# Patient Record
Sex: FEMALE | Race: WHITE
[De-identification: names, ages, dates, MRNs, and addresses within clinical notes are randomized per-mention and may not be internally consistent; named-entity substitution may affect disease eponyms.]

---

## 2017-02-09 ENCOUNTER — E-VISIT (OUTPATIENT)
Dept: OTHER | Age: 67
End: 2017-02-09

## 2017-02-09 DIAGNOSIS — Z53.9 ERRONEOUS ENCOUNTER--DISREGARD: Primary | ICD-10-CM

## 2017-02-09 NOTE — TELEPHONE ENCOUNTER
Responded to mychart.     Patient not seen since 2014. Needs OV. Notified Kina no longer at [place].    Joceline Cantrell, RN, BSN

## 2017-02-10 NOTE — TELEPHONE ENCOUNTER
Spoke with pt. She got meds OTC and is feeling better. Explained rationale of care, and primary care follow-up. Pt. Was understanding and would like to not have the evisit.   Joceline Walsh

## 2017-02-24 ENCOUNTER — OFFICE VISIT (OUTPATIENT)
Dept: ENDOCRINOLOGY | Facility: CLINIC | Age: 67
End: 2017-02-24
Payer: COMMERCIAL

## 2017-02-24 VITALS
SYSTOLIC BLOOD PRESSURE: 151 MMHG | WEIGHT: 160.72 LBS | DIASTOLIC BLOOD PRESSURE: 75 MMHG | HEIGHT: 66 IN | HEART RATE: 52 BPM | BODY MASS INDEX: 25.83 KG/M2

## 2017-02-24 DIAGNOSIS — E78.5 HYPERLIPIDEMIA LDL GOAL <100: ICD-10-CM

## 2017-02-24 DIAGNOSIS — I10 HYPERTENSION GOAL BP (BLOOD PRESSURE) < 130/80: ICD-10-CM

## 2017-02-24 DIAGNOSIS — R80.9 TYPE 2 DIABETES MELLITUS WITH MICROALBUMINURIA, WITHOUT LONG-TERM CURRENT USE OF INSULIN (H): Primary | ICD-10-CM

## 2017-02-24 DIAGNOSIS — E11.29 TYPE 2 DIABETES MELLITUS WITH MICROALBUMINURIA, WITHOUT LONG-TERM CURRENT USE OF INSULIN (H): Primary | ICD-10-CM

## 2017-02-24 DIAGNOSIS — E11.9 TYPE 2 DIABETES MELLITUS WITHOUT COMPLICATION, WITHOUT LONG-TERM CURRENT USE OF INSULIN (H): ICD-10-CM

## 2017-02-24 PROCEDURE — 99215 OFFICE O/P EST HI 40 MIN: CPT | Performed by: INTERNAL MEDICINE

## 2017-02-24 RX ORDER — ATENOLOL 100 MG/1
100 TABLET ORAL DAILY
Qty: 30 TABLET | Refills: 0 | Status: SHIPPED | OUTPATIENT
Start: 2017-02-24 | End: 2017-02-24

## 2017-02-24 RX ORDER — ATENOLOL 100 MG/1
100 TABLET ORAL DAILY
Qty: 90 TABLET | Refills: 3 | Status: SHIPPED | OUTPATIENT
Start: 2017-02-24 | End: 2017-10-02 | Stop reason: ALTCHOICE

## 2017-02-24 RX ORDER — ATORVASTATIN CALCIUM 40 MG/1
40 TABLET, FILM COATED ORAL DAILY
Qty: 90 TABLET | Refills: 3 | Status: SHIPPED | OUTPATIENT
Start: 2017-02-24 | End: 2018-04-17

## 2017-02-24 RX ORDER — METFORMIN HCL 500 MG
TABLET, EXTENDED RELEASE 24 HR ORAL
Qty: 360 TABLET | Refills: 3 | Status: SHIPPED | OUTPATIENT
Start: 2017-02-24 | End: 2018-04-17

## 2017-02-24 RX ORDER — GLIPIZIDE 10 MG/1
10 TABLET ORAL
Qty: 180 TABLET | Refills: 3 | Status: SHIPPED | OUTPATIENT
Start: 2017-02-24 | End: 2018-04-17

## 2017-02-24 RX ORDER — PIOGLITAZONEHYDROCHLORIDE 15 MG/1
15 TABLET ORAL DAILY
Qty: 30 TABLET | Refills: 1 | Status: SHIPPED | OUTPATIENT
Start: 2017-02-24 | End: 2018-05-17 | Stop reason: SINTOL

## 2017-02-24 RX ORDER — AMLODIPINE BESYLATE 10 MG/1
10 TABLET ORAL DAILY
Qty: 90 TABLET | Refills: 3 | Status: SHIPPED | OUTPATIENT
Start: 2017-02-24 | End: 2018-03-26

## 2017-02-24 NOTE — NURSING NOTE
"Isabella Mcginnis's goals for this visit include: Follow up Diabetes  She requests these members of her care team be copied on today's visit information: NO    PCP: Kaitlynn Blanc [facility]    Referring Provider:  No referring provider defined for this encounter.    Chief Complaint   Patient presents with     Diabetes       Initial Ht 1.67 m (5' 5.75\")  Wt 72.9 kg (160 lb 11.5 oz)  LMP 11/28/2007  BMI 26.14 kg/m2 Estimated body mass index is 26.14 kg/(m^2) as calculated from the following:    Height as of this encounter: 1.67 m (5' 5.75\").    Weight as of this encounter: 72.9 kg (160 lb 11.5 oz).  Medication Reconciliation: complete    Do you need any medication refills at today's visit? YES-Atenolol [facility]    "

## 2017-02-24 NOTE — PATIENT INSTRUCTIONS
Sending blood sugars to your provider at [facility]:  We want to help you with your diabetes management, which often requires frequent adjustments to your therapy. For your convenience, we have several ways to send your blood sugars to your doctor for review.    - Send message directly to your doctor through My Chart.  Please ask the rooming staff if you would like to sign up for My Chart.  This is a fast and confidential way to send your information and communicate directly with your provider.   - Record readings and fax to [phone].  We have a template for you to use for your convenience.  - If you have a Medtronic pump, upload to [employer] and notify your provider of your username and password.   - Stop by the clinic with your meter for download.   - My Chart or call Kacie Kearney, Diabetes Educator at [phone]  - Call the clinic and speak to one of the endocrine nurses to relay information on the telephone.  Floresita Alvarez, or Lola at [phone].   -    Please call the on-call Endocrinologist at the [place] [facility] for after       hours/weekend needs at [phone] Option #4.    Please note that you do not need to FAST if you are just having an A1C drawn. Please remember to ALWAYS bring your glucose meter with to your appointment. This data is very important for the management of your care.    Thank you!  Your [facility] Care Team

## 2017-02-24 NOTE — PROGRESS NOTES
- Endocrinology Follow up -    Reason for visit/consult:  DM follow up  [facility] provider: [facility]    HPI: 66 yo female here for the second visit for her DM. Previous visit, noted her A1c has been worsening but she had stressful life event, loss of family at that time and could not take care of her self. We discussed about insulin therapy, but she determined she will do diet modification and lose weight and wanted to pend for insulin. Trajenta was prescribed previous visit, she tried but had hives and stopped less than one month. Currently on metformin and glipizide only.   In the past 2 month, she lost 11 lb, she started to check am glucose at home.   Glucose   fasting am: [phone]  pm- [phone]  BP at home: 115/65, 133/59, 131/77, 116/54, 150/70, 166/66, 139/65    Current regimen  Metformin 2000 daily  glipizide 10 mg BID  Actos 15mg (start new from 2/24/2017)         DM complications:  Retinopathy: cataract surgery 2015, last exam was last year, next January 2017.  Nephropathy: positive microalbuminria once in 2012. On losartan  Neuropathy: none  LDL 85 (5/2015) - on atorvastatin 40 mg    Past Medical/Surgical History:  Past Medical History   Diagnosis Date     Normal delivery      x2 one son with spina bifida     Obesity      Pure hypercholesterolemia      Rheumatic fever without mention of heart involvement age 5      resulting in a murmur until age 18     Type II or unspecified type diabetes mellitus without mention of complication, not stated as uncontrolled      Unspecified essential hypertension      Past Surgical History   Procedure Laterality Date     No history of surgery         Allergies:  Allergies   Allergen Reactions     Nkda [No Known Drug Allergies]      Prinivil [Lisinopril] Cough       Current Medications   Current Outpatient Prescriptions  "  Medication     pioglitazone (ACTOS) 15 MG tablet     atenolol (TENORMIN) 100 MG tablet     amLODIPine (NORVASC) 10 MG tablet     metFORMIN (GLUCOPHAGE-XR) 500 MG 24 hr tablet     glipiZIDE (GLUCOTROL) 10 MG tablet     atorvastatin (LIPITOR) 40 MG tablet     calcium-vitamin D (CALCIUM 600 + D) 600-400 MG-UNIT per tablet     losartan-hydrochlorothiazide (HYZAAR) 100-25 MG per tablet     glucose blood VI test strips (ACCU-CHEK COMPACT DRUM TEST STRIPS) strip     SOFTCLIX LANCETS MISC     Blood Glucose Monitoring Suppl (ACCU-CHEK COMPACT CARE KIT) KIT     MULTIVITAMIN TABS   OR     ASPIRIN 81 MG OR TABS     [DISCONTINUED] atenolol (TENORMIN) 100 MG tablet     [DISCONTINUED] amLODIPine (NORVASC) 10 MG tablet     [DISCONTINUED] metFORMIN (GLUCOPHAGE-XR) 500 MG 24 hr tablet     [DISCONTINUED] glipiZIDE (GLUCOTROL) 10 MG tablet     [DISCONTINUED] atenolol (TENORMIN) 100 MG tablet     [DISCONTINUED] atorvastatin (LIPITOR) 40 MG tablet     No current facility-administered medications for this visit.        Family History:  Family History   Problem Relation Age of Onset     DIABETES Mother      Hypertension Mother      CEREBROVASCULAR DISEASE Mother      C.A.D. Father      Asthma No family hx of      Breast Cancer No family hx of      Cancer - colorectal No family hx of      Prostate Cancer No family hx of        Social History:  Social History   Substance Use Topics     Smoking status: Never Smoker     Smokeless tobacco: Not on file     Alcohol use No       ROS:  Full review of systems taken with the help of the intake sheet. Otherwise a complete 14 point review of systems was taken and is negative unless stated in the history above.    Physical Exam:   Blood pressure 151/75, pulse 52, height 1.67 m (5' 5.75\"), weight 72.9 kg (160 lb 11.5 oz), last menstrual period 11/28/2007.    General: well appearing, no acute distress, pleasant and conversant,   Mental Status/neuro: alert and oriented  Face: symmetrical, normal facial " color  Eyes: anicteric, PERRL  Neck: suppler, no lymphadenopahty  Thyroid: normal size and texture, no nodule palpable  Heart: regular rhythm, S1S2, no murmur appreciated  Lung: clear to auscultation bilaterally  Abdomen: soft, NT/ND  Legs: no swelling or edema  Feet: no deformities or ulcers, 2+ DP pulses, normal monofilament sensation    ENDO VITALS-UMP 2/24/2017 12/2/2016   Weight 160 lb 11.5 oz 171 lb 12.8 oz       Labs (General):   Lab Results   Component Value Date     05/20/2014      Lab Results   Component Value Date    POTASSIUM 3.9 05/20/2014     Lab Results   Component Value Date    CHLORIDE 100 07/25/2012     Lab Results   Component Value Date    MANDI 9.3 07/25/2012     Lab Results   Component Value Date    CO2 29 07/25/2012     Lab Results   Component Value Date    BUN 15 07/25/2012     Lab Results   Component Value Date    CR 0.92 06/15/2015     Lab Results   Component Value Date     05/20/2014     Lab Results   Component Value Date    TSH 1.78 02/24/2014     No results found for: T4  Lab Results   Component Value Date    A1C 11.7 12/02/2016     Assessment and Plan  67 year old female with DM2 on oral regimens, currently on intensive life style modification,    - Glucose log reviewed and still suboptimal but much better for her previous A1c 11, may due to weight loss  - I discussed with patient. She wants to avoid insulin as much as possible, she could not tolerate trajenta, will try pioglitazone 15 mg for a month and will continue monitor glucose levels. We discussed risk and benefit of pioglitazone, I explained cardiac side effect report and bladder cancer report, leg edema to the patient.   - RTC in 4 month    I spent 45 mint with this patient face to face and explained the conditions and plans (more than 50% of time was counseling/coordination of care) . The patient understood and is satisfied with today's visit. Return to clinic with me in 4 months.         Claudette Marino MD  Staff  Physician  Endocrinology and Metabolism  License: [id number]

## 2017-02-24 NOTE — MR AVS SNAPSHOT
After Visit Summary   2/24/2017    Isabella Mcginnis    MRN: [id number]           Patient Information     Date Of Birth          1950        Visit Information        Provider Department      2/24/2017 11:00 AM Claudette Marino MD [facility]        Today's Diagnoses     Type 2 diabetes mellitus with microalbuminuria, without long-term current use of insulin (H)    -  1    Hypertension goal BP (blood pressure) < 130/80          Care Instructions      Sending blood sugars to your provider at [facility]:  We want to help you with your diabetes management, which often requires frequent adjustments to your therapy. For your convenience, we have several ways to send your blood sugars to your doctor for review.    - Send message directly to your doctor through My Chart.  Please ask the rooming staff if you would like to sign up for My Chart.  This is a fast and confidential way to send your information and communicate directly with your provider.   - Record readings and fax to [phone].  We have a template for you to use for your convenience.  - If you have a Medtronic pump, upload to [employer] and notify your provider of your username and password.   - Stop by the clinic with your meter for download.   - My Chart or call Kacie Kearney, Diabetes Educator at [phone]  - Call the clinic and speak to one of the endocrine nurses to relay information on the telephone.  Floresita Alvarez, or Lola at [phone].   -    Please call the on-call Endocrinologist at the [facility] for after       hours/weekend needs at [phone] Option #4.    Please note that you do not need to FAST if you are just having an A1C drawn. Please remember to ALWAYS bring your glucose meter with to your appointment. This data is very important for the management of your care.    Thank you!  Your [facility] Diabetes Care Team              Follow-ups after your visit        Your next 10 appointments already  scheduled     Jun 30, 2017 10:50 AM CDT   Return Visit with Claudette Marino MD, [facility] ENDO NURSE   [facility] ([facility])    [address] [address] [place] [id number] [phone]              Who to contact     If you have questions or need follow up information about today's clinic visit or your schedule please contact [facility] directly at [phone].  Normal or non-critical lab and imaging results will be communicated to you by [employer], letter or phone within 4 business days after the clinic has received the results. If you do not hear from us within 7 days, please contact the clinic through [employer] or phone. If you have a critical or abnormal lab result, we will notify you by phone as soon as possible.  Submit refill requests through [employer] or call your pharmacy and they will forward the refill request to us. Please allow 3 business days for your refill to be completed.          Additional Information About Your Visit        [employer] Information     [employer] gives you secure access to your electronic health record. If you see a primary care provider, you can also send messages to your care team and make appointments. If you have questions, please call your primary care clinic.  If you do not have a primary care provider, please call [phone] and they will assist you.      [employer] is an electronic gateway that provides easy, online access to your medical records. With [employer], you can request a clinic appointment, read your test results, renew a prescription or communicate with your care team.     To access your existing account, please contact your [facility] or call [phone] for assistance.        Care EveryWhere ID     This is your Care EveryWhere ID. This could be used by other organizations to access your [facility] medical records  [id number]        Your Vitals Were     Pulse Height Last Period BMI (Body Mass  "Index)          52 1.67 m (5' 5.75\") 11/28/2007 26.14 kg/m2         Blood Pressure from Last 3 Encounters:   02/24/17 151/75   12/02/16 179/84   09/16/15 136/62    Weight from Last 3 Encounters:   02/24/17 72.9 kg (160 lb 11.5 oz)   12/02/16 77.9 kg (171 lb 12.8 oz)   09/16/15 68.2 kg (150 lb 6 oz)              Today, you had the following     No orders found for display         Today's Medication Changes          These changes are accurate as of: 2/24/17 11:43 AM.  If you have any questions, ask your nurse or doctor.               Start taking these medicines.        Dose/Directions    pioglitazone 15 MG tablet   Commonly known as:  ACTOS   Used for:  Type 2 diabetes mellitus with microalbuminuria, without long-term current use of insulin (H)   Started by:  Claudette Marino MD        Dose:  15 mg   Take 1 tablet (15 mg) by mouth daily   Quantity:  30 tablet   Refills:  1         Stop taking these medicines if you haven't already. Please contact your care team if you have questions.     linagliptin 5 MG Tabs tablet   Commonly known as:  TRADJENTA   Stopped by:  Claudette Marino MD                Where to get your medicines      These medications were sent to [email or web address] - [place], [place] - [address] AT [facility] [address] ( [facility])  [address], [place] [address]     Phone:  [phone]     atenolol 100 MG tablet    pioglitazone 15 MG tablet                Primary Care Provider    [facility]       No address on file        Thank you!     Thank you for choosing [facility]  for your care. Our goal is always to provide you with excellent care. Hearing back from our patients is one way we can continue to improve our services. Please take a few minutes to complete the written survey that you may receive in the mail after your visit with us. Thank you!             Your Updated Medication List - Protect others around you: Learn how to safely use, " store and throw away your medicines at www.disposemymeds.org.          This list is accurate as of: 2/24/17 11:43 AM.  Always use your most recent med list.                   Brand Name Dispense Instructions for use    amLODIPine 10 MG tablet    NORVASC    90 tablet    Take 1 tablet (10 mg) by mouth daily       aspirin 81 MG tablet      1 TABLET DAILY       atenolol 100 MG tablet    TENORMIN    30 tablet    Take 1 tablet (100 mg) by mouth daily       atorvastatin 40 MG tablet    LIPITOR    90 tablet    Take 1 tablet (40 mg) by mouth daily       blood glucose monitoring lancets     300 each    Use to check blood sugar 3-4 times daily       blood glucose monitoring meter device kit     1 kit    Use to test blood sugars 3-4  times daily or as directed.       blood glucose monitoring test strip    ACCU-CHEK COMPACT DRUM    400 strip    Use to test blood sugars 3-4 times daily or as directed.       calcium-vitamin D 600-400 MG-UNIT per tablet    calcium 600 + D    90 tablet    Take 1 tablet by mouth daily       glipiZIDE 10 MG tablet    GLUCOTROL    180 tablet    Take 1 tablet (10 mg) by mouth 2 times daily (before meals)       losartan-hydrochlorothiazide 100-25 MG per tablet    HYZAAR    90 tablet    Take 1 tablet by mouth daily       metFORMIN 500 MG 24 hr tablet    GLUCOPHAGE-XR    360 tablet    4 tabs daily or as directed       MULTIVITAMIN TABS   OR      1 TABLET DAILY       pioglitazone 15 MG tablet    ACTOS    30 tablet    Take 1 tablet (15 mg) by mouth daily

## 2017-03-24 ENCOUNTER — TRANSFERRED RECORDS (OUTPATIENT)
Dept: HEALTH INFORMATION MANAGEMENT | Facility: CLINIC | Age: 67
End: 2017-03-24

## 2017-05-26 ENCOUNTER — HEALTH MAINTENANCE LETTER (OUTPATIENT)
Age: 67
End: 2017-05-26

## 2017-10-02 ENCOUNTER — TELEPHONE (OUTPATIENT)
Dept: ENDOCRINOLOGY | Facility: CLINIC | Age: 67
End: 2017-10-02

## 2017-10-02 DIAGNOSIS — I10 ESSENTIAL HYPERTENSION: Primary | ICD-10-CM

## 2017-10-02 RX ORDER — METOPROLOL SUCCINATE 100 MG/1
100 TABLET, EXTENDED RELEASE ORAL DAILY
Qty: 90 TABLET | Refills: 3 | Status: SHIPPED | OUTPATIENT
Start: 2017-10-02 | End: 2018-05-17

## 2018-01-17 DIAGNOSIS — I10 BENIGN ESSENTIAL HYPERTENSION: ICD-10-CM

## 2018-01-17 RX ORDER — LOSARTAN POTASSIUM AND HYDROCHLOROTHIAZIDE 25; 100 MG/1; MG/1
TABLET ORAL
Qty: 90 TABLET | Refills: 3 | Status: SHIPPED | OUTPATIENT
Start: 2018-01-17

## 2018-03-23 ENCOUNTER — TRANSFERRED RECORDS (OUTPATIENT)
Dept: HEALTH INFORMATION MANAGEMENT | Facility: CLINIC | Age: 68
End: 2018-03-23

## 2018-03-26 DIAGNOSIS — I10 HYPERTENSION GOAL BP (BLOOD PRESSURE) < 130/80: ICD-10-CM

## 2018-03-26 RX ORDER — AMLODIPINE BESYLATE 10 MG/1
10 TABLET ORAL DAILY
Qty: 90 TABLET | Refills: 0 | Status: SHIPPED | OUTPATIENT
Start: 2018-03-26 | End: 2018-05-17

## 2018-03-26 NOTE — TELEPHONE ENCOUNTER
Faxed refill request received from [facility].   Mediation Requested: Amlodipine 10mg  Directions: Take one tablet by mouth daily  Quantity: 90  Last Office Visit: 2/24/17  Next Appointment Scheduled for: 5/4/18 with Dr. Thakur.    Writer called patient to review. Patient is seeing Dr. Thakur in May to see if Endocrine follow up is needed or if primary can manage care.    Will forward to Dr. Marino to review.    Floresita Smith LPN  Adult Endocrinology  [facility]

## 2018-04-01 ENCOUNTER — TRANSFERRED RECORDS (OUTPATIENT)
Dept: HEALTH INFORMATION MANAGEMENT | Facility: CLINIC | Age: 68
End: 2018-04-01

## 2018-04-17 DIAGNOSIS — E78.5 HYPERLIPIDEMIA LDL GOAL <100: ICD-10-CM

## 2018-04-17 DIAGNOSIS — E11.9 TYPE 2 DIABETES MELLITUS WITHOUT COMPLICATION, WITHOUT LONG-TERM CURRENT USE OF INSULIN (H): ICD-10-CM

## 2018-04-17 RX ORDER — METFORMIN HCL 500 MG
TABLET, EXTENDED RELEASE 24 HR ORAL
Qty: 360 TABLET | Refills: 0 | Status: SHIPPED | OUTPATIENT
Start: 2018-04-17 | End: 2018-05-17

## 2018-04-17 RX ORDER — ATORVASTATIN CALCIUM 40 MG/1
40 TABLET, FILM COATED ORAL DAILY
Qty: 90 TABLET | Refills: 0 | Status: SHIPPED | OUTPATIENT
Start: 2018-04-17 | End: 2018-06-25

## 2018-04-17 RX ORDER — GLIPIZIDE 10 MG/1
10 TABLET ORAL
Qty: 180 TABLET | Refills: 3 | Status: SHIPPED | OUTPATIENT
Start: 2018-04-17

## 2018-04-17 NOTE — TELEPHONE ENCOUNTER
Please refer to 3/26/18 Encounter for details.    Patient hoping to transfer care to primary.     Will forward to Dr. Marino to review.    Floresita Smith LPN  Adult Endocrinology  [facility]

## 2018-05-14 ASSESSMENT — ACTIVITIES OF DAILY LIVING (ADL)
CURRENT_FUNCTION: NO ASSISTANCE NEEDED
I_NEED_ASSISTANCE_FOR_THE_FOLLOWING_DAILY_ACTIVITIES:: NO ASSISTANCE IS NEEDED

## 2018-05-17 ENCOUNTER — OFFICE VISIT (OUTPATIENT)
Dept: PEDIATRICS | Facility: CLINIC | Age: 68
End: 2018-05-17
Payer: COMMERCIAL

## 2018-05-17 VITALS
HEART RATE: 71 BPM | OXYGEN SATURATION: 97 % | BODY MASS INDEX: 26.68 KG/M2 | HEIGHT: 66 IN | DIASTOLIC BLOOD PRESSURE: 84 MMHG | SYSTOLIC BLOOD PRESSURE: 188 MMHG | WEIGHT: 166 LBS | TEMPERATURE: 97.3 F

## 2018-05-17 DIAGNOSIS — I10 ESSENTIAL HYPERTENSION: ICD-10-CM

## 2018-05-17 DIAGNOSIS — Z23 ENCOUNTER FOR IMMUNIZATION: ICD-10-CM

## 2018-05-17 DIAGNOSIS — Z78.0 MENOPAUSE: ICD-10-CM

## 2018-05-17 DIAGNOSIS — E78.5 HYPERLIPIDEMIA LDL GOAL <100: ICD-10-CM

## 2018-05-17 DIAGNOSIS — E11.9 TYPE 2 DIABETES MELLITUS WITHOUT COMPLICATION, WITHOUT LONG-TERM CURRENT USE OF INSULIN (H): ICD-10-CM

## 2018-05-17 DIAGNOSIS — Z11.59 ENCOUNTER FOR HEPATITIS C SCREENING TEST FOR LOW RISK PATIENT: ICD-10-CM

## 2018-05-17 DIAGNOSIS — Z00.00 MEDICARE ANNUAL WELLNESS VISIT, SUBSEQUENT: Primary | ICD-10-CM

## 2018-05-17 DIAGNOSIS — I10 WHITE COAT SYNDROME WITH HYPERTENSION: ICD-10-CM

## 2018-05-17 DIAGNOSIS — E11.65 TYPE 2 DIABETES MELLITUS WITH HYPERGLYCEMIA, WITHOUT LONG-TERM CURRENT USE OF INSULIN (H): ICD-10-CM

## 2018-05-17 DIAGNOSIS — I10 HYPERTENSION GOAL BP (BLOOD PRESSURE) < 130/80: ICD-10-CM

## 2018-05-17 DIAGNOSIS — Z12.31 ENCOUNTER FOR SCREENING MAMMOGRAM FOR BREAST CANCER: ICD-10-CM

## 2018-05-17 PROCEDURE — 90471 IMMUNIZATION ADMIN: CPT | Performed by: INTERNAL MEDICINE

## 2018-05-17 PROCEDURE — 90714 TD VACC NO PRESV 7 YRS+ IM: CPT | Performed by: INTERNAL MEDICINE

## 2018-05-17 PROCEDURE — 90670 PCV13 VACCINE IM: CPT | Performed by: INTERNAL MEDICINE

## 2018-05-17 PROCEDURE — 90472 IMMUNIZATION ADMIN EACH ADD: CPT | Performed by: INTERNAL MEDICINE

## 2018-05-17 PROCEDURE — 99213 OFFICE O/P EST LOW 20 MIN: CPT | Mod: 25 | Performed by: INTERNAL MEDICINE

## 2018-05-17 PROCEDURE — G0439 PPPS, SUBSEQ VISIT: HCPCS | Performed by: INTERNAL MEDICINE

## 2018-05-17 RX ORDER — ATENOLOL 100 MG/1
100 TABLET ORAL DAILY
Qty: 90 TABLET | Refills: 3 | Status: SHIPPED | OUTPATIENT
Start: 2018-05-17

## 2018-05-17 RX ORDER — AMLODIPINE BESYLATE 10 MG/1
10 TABLET ORAL DAILY
Qty: 90 TABLET | Refills: 3 | Status: SHIPPED | OUTPATIENT
Start: 2018-05-17

## 2018-05-17 RX ORDER — METFORMIN HCL 500 MG
TABLET, EXTENDED RELEASE 24 HR ORAL
Qty: 360 TABLET | Refills: 1 | Status: SHIPPED | OUTPATIENT
Start: 2018-05-17 | End: 2018-05-21 | Stop reason: SINTOL

## 2018-05-17 NOTE — PROGRESS NOTES
SUBJECTIVE:   Isabella Mcginnis is a 68 year old female who presents for Preventive Visit.    New patient to establish care and wellness visit. Her  who was a pt of mine passed away a year ago. She has not been taking care of herself in the last 2 years while caring for him during his last year and since his passing. She has gone through grieving and is feeling better now. She has good support. One son lives in [place]. One adult disabled son lives in [place]. Patient is in the process of selling her house and plans to eventually move to [place] to be closer to her son there. Her other son is going to move as well.   History of HTN, but also white coat component. She tracks her BP at home. Always in the 110s to 120s. She used to be on atenolol but pharmacy switched to to metoprolol. This has a higher copay. She would like to go back to atenolol.  She does not want to go back to see endocrine anymore for diabetes. Feels she wants just one doctor to manage everything.   Are you in the first 12 months of your Medicare Part B coverage?  No    Healthy Habits:    Do you get at least three servings of calcium containing foods daily (dairy, green leafy vegetables, etc.)? no    Amount of exercise or daily activities, outside of work: 2 day(s) per week    Problems taking medications regularly No    Medication side effects: No    Have you had an eye exam in the past two years? yes    Do you see a dentist twice per year? no    Do you have sleep apnea, excessive snoring or daytime drowsiness?yes      Ability to successfully perform activities of daily living: Yes, no assistance needed    Home safety:  none identified     Hearing impairment: No    Fall risk:  Fallen 2 or more times in the past year?: No  Any fall with injury in the past year?: No        COGNITIVE SCREEN  1) Repeat 3 items (Banana, Sunrise, Chair)    2) Clock draw: NORMAL  3) 3 item recall: Recalls 3 objects   Results: NORMAL clock, 3 items recalled:  COGNITIVE IMPAIRMENT LESS LIKELY    Mini-CogTM Copyright WAGNER Ngo. Licensed by the author for use in [facility]; reprinted with permission ([email or web address]@[email or web address]). All rights reserved.            PROBLEMS TO ADD ON...    Reviewed and updated as needed this visit by clinical staff  Tobacco  Allergies  Meds  Soc Hx        Reviewed and updated as needed this visit by Provider        Social History   Substance Use Topics     Smoking status: Never Smoker     Smokeless tobacco: Never Used     Alcohol use No       If you drink alcohol do you typically have >3 drinks per day or >7 drinks per week? No                        Today's PHQ-2 Score:   PHQ-2 ( 1999 Pfizer) 5/17/2018 5/14/2018   Q1: Little interest or pleasure in doing things 0 0   Q2: Feeling down, depressed or hopeless 0 0   PHQ-2 Score 0 0   Q1: Little interest or pleasure in doing things - Not at all   Q2: Feeling down, depressed or hopeless - Not at all   PHQ-2 Score - 0       Do you feel safe in your environment - Yes    Do you have a Health Care Directive?: No: Advance care planning reviewed with patient; information given to patient to review.    Current providers sharing in care for this patient include:   Patient Care Team:  Clinic, [facility] as PCP - General    The following health maintenance items are reviewed in Epic and correct as of today:  Health Maintenance   Topic Date Due     ADVANCE DIRECTIVE PLANNING Q5 YRS  01/23/2006     MAMMO SCREEN Q2 YR (SYSTEM ASSIGNED)  05/20/2015     DEXA SCAN SCREENING (SYSTEM ASSIGNED)  01/23/2016     FOOT EXAM Q1 YEAR  12/02/2017     INFLUENZA VACCINE (Season Ended) 09/01/2018     A1C Q6 MO  11/18/2018     EYE EXAM Q1 YEAR  04/01/2019     FALL RISK ASSESSMENT  05/17/2019     PNEUMOCOCCAL (2 of 2 - PPSV23) 05/17/2019     CREATININE Q1 YEAR  05/18/2019     LIPID MONITORING Q1 YEAR  05/18/2019     MICROALBUMIN Q1 YEAR  05/18/2019     TSH W/ FREE T4 REFLEX Q2 YEAR  05/18/2020     COLON CANCER  "SCREEN (SYSTEM ASSIGNED)  07/08/2021     TETANUS IMMUNIZATION (SYSTEM ASSIGNED)  05/17/2028     HEPATITIS C SCREENING  Completed     Labs reviewed in EPIC    Pneumonia Vaccine:Adults age 65+ who have not received previous Pneumovax (PPSV23) or PCV13 as an adult: Should first be given PCV13 AND then should be given PPSV23 6-12 months after PCV13  Mammogram Screening: Patient over age 50, mutual decision to screen reflected in health maintenance.    ROS:  Constitutional, HEENT, cardiovascular, pulmonary, gi and gu systems are negative, except as otherwise noted.    OBJECTIVE:   /84  Pulse 71  Temp 97.3  F (36.3  C) (Temporal)  Ht 5' 5.75\" (1.67 m)  Wt 166 lb (75.3 kg)  LMP 12/21/2007  SpO2 97%  BMI 27 kg/m2 Estimated body mass index is 27 kg/(m^2) as calculated from the following:    Height as of this encounter: 5' 5.75\" (1.67 m).    Weight as of this encounter: 166 lb (75.3 kg).  EXAM:   GENERAL: healthy, alert and no distress  EYES: Eyes grossly normal to inspection, PERRL and conjunctivae and sclerae normal  HENT: ear canals and TM's normal, nose and mouth without ulcers or lesions  NECK: no adenopathy, no asymmetry, masses, or scars and thyroid normal to palpation  RESP: lungs clear to auscultation - no rales, rhonchi or wheezes  BREAST: normal without masses, tenderness or nipple discharge and no palpable axillary masses or adenopathy  CV: regular rate and rhythm, normal S1 S2, no S3 or S4, no murmur, click or rub, no peripheral edema and peripheral pulses strong  ABDOMEN: soft, nontender, no hepatosplenomegaly, no masses and bowel sounds normal  MS: no gross musculoskeletal defects noted, no edema  SKIN: no suspicious lesions or rashes  NEURO: Normal strength and tone, mentation intact and speech normal  PSYCH: mentation appears normal, affect normal/bright        ASSESSMENT / PLAN:       ICD-10-CM    1. Medicare annual wellness visit, subsequent Z00.00    2. Hypertension goal BP (blood pressure) < " 130/80 I10 CBC with platelets     Albumin Random Urine Quantitative with Creat Ratio     amLODIPine (NORVASC) 10 MG tablet   3. Hyperlipidemia LDL goal <100 E78.5 Lipid panel reflex to direct LDL Non-fasting   4. Type 2 diabetes mellitus with hyperglycemia, without long-term current use of insulin (H) E11.65 CBC with platelets     Comprehensive metabolic panel (BMP + Alb, Alk Phos, ALT, AST, Total. Bili, TP)     Hemoglobin A1c     Albumin Random Urine Quantitative with Creat Ratio     TSH with free T4 reflex   5. Encounter for hepatitis C screening test for low risk patient Z11.59 Hepatitis C antibody   6. Encounter for immunization Z23 TD PRESERV FREE >=7 YRS ADS IM     Pneumococcal vaccine 13 valent PCV13 IM (Prevnar) [31934]     ADMIN MEDICARE: Pneumococcal Vaccine ()     Pneumococcal vaccine 13 valent PCV13 IM (Prevnar) [92024]   7. Encounter for screening mammogram for breast cancer Z12.31 MA Screening Digital Bilateral   8. Menopause Z78.0 DX Hip/Pelvis/Spine   9. Type 2 diabetes mellitus without complication, without long-term current use of insulin (H) E11.9 metFORMIN (GLUCOPHAGE-XR) 500 MG 24 hr tablet   10. Essential hypertension I10 atenolol (TENORMIN) 100 MG tablet   11. White coat syndrome with hypertension I10      -- patient with multiple chronic health issues.   -- HTN: BP is elevated but white coat syndrome. Change metoprolol to Atenolol due to cost. She is due for monitoring labs. She will return to do them.  -- diabetes: may not be as well controlled based on patient's self report. Will wait for labs.     Preventive: mammogram, updated vaccines, colonoscopy is up to date.     End of Life Planning:  Patient currently has an advanced directive: No.  I have verified the patient's ablity to prepare an advanced directive/make health care decisions.  Literature was provided to assist patient in preparing an advanced directive.    COUNSELING:  Reviewed preventive health counseling, as reflected in  "patient instructions        Estimated body mass index is 27 kg/(m^2) as calculated from the following:    Height as of this encounter: 5' 5.75\" (1.67 m).    Weight as of this encounter: 166 lb (75.3 kg).       reports that she has never smoked. She has never used smokeless tobacco.      Appropriate preventive services were discussed with this patient, including applicable screening as appropriate for cardiovascular disease, diabetes, osteopenia/osteoporosis, and glaucoma.  As appropriate for age/gender, discussed screening for colorectal cancer, prostate cancer, breast cancer, and cervical cancer. Checklist reviewing preventive services available has been given to the patient.    Reviewed patients plan of care and provided an AVS. The Intermediate Care Plan ( asthma action plan, low back pain action plan, and migraine action plan) for Isabella meets the Care Plan requirement. This Care Plan has been established and reviewed with the Patient.    Counseling Resources:  ATP IV Guidelines  Pooled Cohorts Equation Calculator  Breast Cancer Risk Calculator  FRAX Risk Assessment  ICSI Preventive Guidelines  Dietary Guidelines for Americans, 2010  [employer]'s MyPlate  ASA Prophylaxis  Lung CA Screening    Jennifer Thakur MD PhD  [facility]  Answers for HPI/ROS submitted by the patient on 5/14/2018   Annual Exam:  Getting at least 3 servings of Calcium per day:: NO  Bi-annual eye exam:: Yes  Dental care twice a year:: Yes  Sleep apnea or symptoms of sleep apnea:: None  Diet:: Low salt, Diabetic  Frequency of exercise:: 2-3 days/week  Taking medications regularly:: Yes  Medication side effects:: None  Additional concerns today:: No  Activities of Daily Living: no assistance needed  Home safety: no safety concerns identified  Hearing Impairment:: no hearing concerns  PHQ-2 Score: 0  Duration of exercise:: 15-30 minutes    "

## 2018-05-17 NOTE — MR AVS SNAPSHOT
After Visit Summary   2018    Isabella Mcginnis    MRN: [id number]           Patient Information     Date Of Birth          1950        Visit Information        Provider Department      2018 11:50 AM Jennifer Thakur MD PhD [facility]        Today's Diagnoses     Medicare annual wellness visit, subsequent    -  1    Hypertension goal BP (blood pressure) < 130/80        Hyperlipidemia LDL goal <100        Type 2 diabetes mellitus with hyperglycemia, without long-term current use of insulin (H)        Encounter for hepatitis C screening test for low risk patient        Encounter for immunization        Encounter for screening mammogram for breast cancer        Menopause        Type 2 diabetes mellitus without complication, without long-term current use of insulin (H)        Essential hypertension          Care Instructions    Make appointment(s) for:   -- fasting lab tomorrow  -- diabetes follow up next week  -- mammogram and bone density.         Medication(s) prescribed today:    Orders Placed This Encounter   Medications     amLODIPine (NORVASC) 10 MG tablet     Sig: Take 1 tablet (10 mg) by mouth daily     Dispense:  90 tablet     Refill:  3     metFORMIN (GLUCOPHAGE-XR) 500 MG 24 hr tablet     Si tabs daily or as directed     Dispense:  360 tablet     Refill:  1     atenolol (TENORMIN) 100 MG tablet     Sig: Take 1 tablet (100 mg) by mouth daily     Dispense:  90 tablet     Refill:  3     Replaces metoprolol             Preventive Health Recommendations    Female Ages 65 +    Yearly exam:     See your health care provider every year in order to  o Review health changes.   o Discuss preventive care.    o Review your medicines if your doctor has prescribed any.      You no longer need a yearly Pap test unless you've had an abnormal Pap test in the past 10 years. If you have vaginal symptoms, such as bleeding or discharge, be sure to talk with your provider about a Pap  test.      Every 1 to 2 years, have a mammogram.  If you are over 69, talk with your health care provider about whether or not you want to continue having screening mammograms.      Every 10 years, have a colonoscopy. Or, have a yearly FIT test (stool test). These exams will check for colon cancer.       Have a cholesterol test every 5 years, or more often if your doctor advises it.       Have a diabetes test (fasting glucose) every three years. If you are at risk for diabetes, you should have this test more often.       At age 65, have a bone density scan (DEXA) to check for osteoporosis (brittle bone disease).    Shots:    Get a flu shot each year.    Get a tetanus shot every 10 years.    Talk to your doctor about your pneumonia vaccines. There are now two you should receive - Pneumovax (PPSV 23) and Prevnar (PCV 13).    Talk to your doctor about the shingles vaccine.    Talk to your doctor about the hepatitis B vaccine.    Nutrition:     Eat at least 5 servings of fruits and vegetables each day.      Eat whole-grain bread, whole-wheat pasta and brown rice instead of white grains and rice.      Talk to your provider about Calcium and Vitamin D.     Lifestyle    Exercise at least 150 minutes a week (30 minutes a day, 5 days a week). This will help you control your weight and prevent disease.      Limit alcohol to one drink per day.      No smoking.       Wear sunscreen to prevent skin cancer.       See your dentist twice a year for an exam and cleaning.      See your eye doctor every 1 to 2 years to screen for conditions such as glaucoma, macular degeneration and cataracts.          Follow-ups after your visit        Future tests that were ordered for you today     Open Future Orders        Priority Expected Expires Ordered    MA Screening Digital Bilateral Routine  5/17/2019 5/17/2018    DX Hip/Pelvis/Spine Routine  5/17/2019 5/17/2018    CBC with platelets Routine 5/17/2018 5/31/2018 5/17/2018    Comprehensive  metabolic panel (BMP + Alb, Alk Phos, ALT, AST, Total. Bili, TP) Routine 5/17/2018 5/31/2018 5/17/2018    Hemoglobin A1c Routine 5/17/2018 5/31/2018 5/17/2018    Lipid panel reflex to direct LDL Non-fasting Routine 5/17/2018 5/31/2018 5/17/2018    Albumin Random Urine Quantitative with Creat Ratio Routine 5/17/2018 5/31/2018 5/17/2018    TSH with free T4 reflex Routine 5/17/2018 5/31/2018 5/17/2018    Pneumococcal vaccine 13 valent PCV13 IM (Prevnar) [99023] Routine 5/17/2018 5/31/2018 5/17/2018    ADMIN MEDICARE: Pneumococcal Vaccine () Routine 5/17/2018 5/31/2018 5/17/2018    Hepatitis C antibody Routine 5/17/2018 5/31/2018 5/17/2018            Who to contact     If you have questions or need follow up information about today's clinic visit or your schedule please contact [facility] directly at [phone].  Normal or non-critical lab and imaging results will be communicated to you by StatusPagehart, letter or phone within 4 business days after the clinic has received the results. If you do not hear from us within 7 days, please contact the clinic through NERIt or phone. If you have a critical or abnormal lab result, we will notify you by phone as soon as possible.  Submit refill requests through Workable or call your pharmacy and they will forward the refill request to us. Please allow 3 business days for your refill to be completed.          Additional Information About Your Visit        Workable Information     Workable gives you secure access to your electronic health record. If you see a primary care provider, you can also send messages to your care team and make appointments. If you have questions, please call your primary care clinic.  If you do not have a primary care provider, please call [phone] and they will assist you.      Workable is an electronic gateway that provides easy, online access to your medical records. With Workable, you can request a clinic appointment, read your test  "results, renew a prescription or communicate with your care team.     To access your existing account, please contact your [facility] or call [phone] for assistance.        Care EveryWhere ID     This is your Care EveryWhere ID. This could be used by other organizations to access your [facility] medical records  [id number]        Your Vitals Were     Pulse Temperature Height Last Period Pulse Oximetry BMI (Body Mass Index)    71 97.3  F (36.3  C) (Temporal) 5' 5.75\" (1.67 m) 12/21/2007 97% 27 kg/m2       Blood Pressure from Last 3 Encounters:   05/17/18 188/84   02/24/17 151/75   12/02/16 179/84    Weight from Last 3 Encounters:   05/17/18 166 lb (75.3 kg)   02/24/17 160 lb 11.5 oz (72.9 kg)   12/02/16 171 lb 12.8 oz (77.9 kg)              We Performed the Following     TD PRESERV FREE >=7 YRS ADS IM          Today's Medication Changes          These changes are accurate as of 5/17/18 12:26 PM.  If you have any questions, ask your nurse or doctor.               Start taking these medicines.        Dose/Directions    atenolol 100 MG tablet   Commonly known as:  TENORMIN   Used for:  Essential hypertension   Replaces:  metoprolol succinate 100 MG 24 hr tablet   Started by:  Jennifer Thakur MD PhD        Dose:  100 mg   Take 1 tablet (100 mg) by mouth daily   Quantity:  90 tablet   Refills:  3         Stop taking these medicines if you haven't already. Please contact your care team if you have questions.     metoprolol succinate 100 MG 24 hr tablet   Commonly known as:  TOPROL-XL   Replaced by:  atenolol 100 MG tablet   Stopped by:  Jennifer Thakur MD PhD           pioglitazone 15 MG tablet   Commonly known as:  ACTOS   Stopped by:  Jennifer Thakur MD PhD                Where to get your medicines      These medications were sent to [facility] - [place], [place] - [address] [facility] [address]  [address] [facility] [address] [facility] [place] [address]     Phone:  [phone]     amLODIPine 10 " MG tablet    atenolol 100 MG tablet    metFORMIN 500 MG 24 hr tablet                Primary Care Provider Office Phone # Fax #    Kaitlynn [facility] [phone] [address]  [facility] [address]        Equal Access to Services     JAMAICA CASTILLO : Hadii aad ku hadasho Soomaali, waaxda luqadaha, qaybta kaalmada adeegyada, waxay idiin hayaan adeeg khchidi lasaeedkarla almonte. So [facility] [phone].    ATENCIÓN: Si habla español, tiene a singh disposición servicios gratuitos de asistencia lingüística. Llame al [phone].    We comply with applicable federal civil rights laws and [place] laws. We do not discriminate on the basis of race, color, national origin, age, disability, sex, sexual orientation, or gender identity.            Thank you!     Thank you for choosing [facility]  for your care. Our goal is always to provide you with excellent care. Hearing back from our patients is one way we can continue to improve our services. Please take a few minutes to complete the written survey that you may receive in the mail after your visit with us. Thank you!             Your Updated Medication List - Protect others around you: Learn how to safely use, store and throw away your medicines at www.disposemymeds.org.          This list is accurate as of 5/17/18 12:26 PM.  Always use your most recent med list.                   Brand Name Dispense Instructions for use Diagnosis    amLODIPine 10 MG tablet    NORVASC    90 tablet    Take 1 tablet (10 mg) by mouth daily    Hypertension goal BP (blood pressure) < 130/80       aspirin 81 MG tablet      1 TABLET DAILY        atenolol 100 MG tablet    TENORMIN    90 tablet    Take 1 tablet (100 mg) by mouth daily    Essential hypertension       atorvastatin 40 MG tablet    LIPITOR    90 tablet    Take 1 tablet (40 mg) by mouth daily    Hyperlipidemia LDL goal <100       blood glucose monitoring lancets     300 each    Use to check blood  sugar 3-4 times daily    Type 2 diabetes, HbA1c goal < 7% (H)       blood glucose monitoring meter device kit     1 kit    Use to test blood sugars 3-4  times daily or as directed.    Hypertension goal BP (blood pressure) < 130/80, Type 2 diabetes, HbA1c goal < 7% (H), Hyperlipidemia LDL goal <100, Need for prophylactic vaccination and inoculation against influenza       blood glucose monitoring test strip    ACCU-CHEK COMPACT DRUM    400 strip    Use to test blood sugars 3-4 times daily or as directed.    Type 2 diabetes, HbA1c goal < 7% (H)       calcium-vitamin D 600-400 MG-UNIT per tablet    calcium 600 + D    90 tablet    Take 1 tablet by mouth daily    Type 2 diabetes mellitus without complication, without long-term current use of insulin (H)       glipiZIDE 10 MG tablet    GLUCOTROL    180 tablet    Take 1 tablet (10 mg) by mouth 2 times daily (before meals)    Type 2 diabetes mellitus without complication, without long-term current use of insulin (H)       losartan-hydrochlorothiazide 100-25 MG per tablet    HYZAAR    90 tablet    Take 1 tablet by mouth daily    Benign essential hypertension       metFORMIN 500 MG 24 hr tablet    GLUCOPHAGE-XR    360 tablet    4 tabs daily or as directed    Type 2 diabetes mellitus without complication, without long-term current use of insulin (H)       MULTIVITAMIN TABS   OR      1 TABLET DAILY

## 2018-05-17 NOTE — NURSING NOTE
Screening Questionnaire for Adult Immunization    Are you sick today?   No   Do you have allergies to medications, food, a vaccine component or latex?   No   Have you ever had a serious reaction after receiving a vaccination?   No   Do you have a long-term health problem with heart disease, lung disease, asthma, kidney disease, metabolic disease (e.g. diabetes), anemia, or other blood disorder?   No   Do you have cancer, leukemia, HIV/AIDS, or any other immune system problem?   No   In the past 3 months, have you taken medications that affect  your immune system, such as prednisone, other steroids, or anticancer drugs; drugs for the treatment of rheumatoid arthritis, Crohn s disease, or psoriasis; or have you had radiation treatments?   No   Have you had a seizure, or a brain or other nervous system problem?   No   During the past year, have you received a transfusion of blood or blood     products, or been given immune (gamma) globulin or antiviral drug?   No   For women: Are you pregnant or is there a chance you could become        pregnant during the next month?   No   Have you received any vaccinations in the past 4 weeks?   No     Immunization questionnaire answers were all negative.      MNVFC doesn't apply on this patient           Screening performed by Paige Fonseca

## 2018-05-17 NOTE — Clinical Note
Please abstract the following data from this visit with this patient into the appropriate field in Epic:  Eye exam with ophthalmology on this date: 4-1-18 [place] [employer]

## 2018-05-17 NOTE — PATIENT INSTRUCTIONS
Make appointment(s) for:   -- fasting lab tomorrow  -- diabetes follow up next week  -- mammogram and bone density.         Medication(s) prescribed today:    Orders Placed This Encounter   Medications     amLODIPine (NORVASC) 10 MG tablet     Sig: Take 1 tablet (10 mg) by mouth daily     Dispense:  90 tablet     Refill:  3     metFORMIN (GLUCOPHAGE-XR) 500 MG 24 hr tablet     Si tabs daily or as directed     Dispense:  360 tablet     Refill:  1     atenolol (TENORMIN) 100 MG tablet     Sig: Take 1 tablet (100 mg) by mouth daily     Dispense:  90 tablet     Refill:  3     Replaces metoprolol             Preventive Health Recommendations    Female Ages 65 +    Yearly exam:     See your health care provider every year in order to  o Review health changes.   o Discuss preventive care.    o Review your medicines if your doctor has prescribed any.      You no longer need a yearly Pap test unless you've had an abnormal Pap test in the past 10 years. If you have vaginal symptoms, such as bleeding or discharge, be sure to talk with your provider about a Pap test.      Every 1 to 2 years, have a mammogram.  If you are over 69, talk with your health care provider about whether or not you want to continue having screening mammograms.      Every 10 years, have a colonoscopy. Or, have a yearly FIT test (stool test). These exams will check for colon cancer.       Have a cholesterol test every 5 years, or more often if your doctor advises it.       Have a diabetes test (fasting glucose) every three years. If you are at risk for diabetes, you should have this test more often.       At age 65, have a bone density scan (DEXA) to check for osteoporosis (brittle bone disease).    Shots:    Get a flu shot each year.    Get a tetanus shot every 10 years.    Talk to your doctor about your pneumonia vaccines. There are now two you should receive - Pneumovax (PPSV 23) and Prevnar (PCV 13).    Talk to your doctor about the shingles  vaccine.    Talk to your doctor about the hepatitis B vaccine.    Nutrition:     Eat at least 5 servings of fruits and vegetables each day.      Eat whole-grain bread, whole-wheat pasta and brown rice instead of white grains and rice.      Talk to your provider about Calcium and Vitamin D.     Lifestyle    Exercise at least 150 minutes a week (30 minutes a day, 5 days a week). This will help you control your weight and prevent disease.      Limit alcohol to one drink per day.      No smoking.       Wear sunscreen to prevent skin cancer.       See your dentist twice a year for an exam and cleaning.      See your eye doctor every 1 to 2 years to screen for conditions such as glaucoma, macular degeneration and cataracts.

## 2018-05-18 DIAGNOSIS — E11.65 TYPE 2 DIABETES MELLITUS WITH HYPERGLYCEMIA, WITHOUT LONG-TERM CURRENT USE OF INSULIN (H): ICD-10-CM

## 2018-05-18 DIAGNOSIS — E78.5 HYPERLIPIDEMIA LDL GOAL <100: ICD-10-CM

## 2018-05-18 DIAGNOSIS — Z11.59 ENCOUNTER FOR HEPATITIS C SCREENING TEST FOR LOW RISK PATIENT: ICD-10-CM

## 2018-05-18 DIAGNOSIS — I10 HYPERTENSION GOAL BP (BLOOD PRESSURE) < 130/80: ICD-10-CM

## 2018-05-18 LAB
ALBUMIN SERPL-MCNC: 3.7 G/DL (ref 3.4–5)
ALP SERPL-CCNC: 128 U/L (ref 40–150)
ALT SERPL W P-5'-P-CCNC: 16 U/L (ref 0–50)
ANION GAP SERPL CALCULATED.3IONS-SCNC: 6 MMOL/L (ref 3–14)
AST SERPL W P-5'-P-CCNC: 9 U/L (ref 0–45)
BILIRUB SERPL-MCNC: 0.7 MG/DL (ref 0.2–1.3)
BUN SERPL-MCNC: 15 MG/DL (ref 7–30)
CALCIUM SERPL-MCNC: 9.2 MG/DL (ref 8.5–10.1)
CHLORIDE SERPL-SCNC: 101 MMOL/L (ref 94–109)
CHOLEST SERPL-MCNC: 133 MG/DL
CO2 SERPL-SCNC: 30 MMOL/L (ref 20–32)
CREAT SERPL-MCNC: 0.85 MG/DL (ref 0.52–1.04)
CREAT UR-MCNC: 145 MG/DL
ERYTHROCYTE [DISTWIDTH] IN BLOOD BY AUTOMATED COUNT: 12.1 % (ref 10–15)
GFR SERPL CREATININE-BSD FRML MDRD: 67 ML/MIN/1.7M2
GLUCOSE SERPL-MCNC: 364 MG/DL (ref 70–99)
HBA1C MFR BLD: 13 % (ref 0–5.6)
HCT VFR BLD AUTO: 37.2 % (ref 35–47)
HCV AB SERPL QL IA: NONREACTIVE
HDLC SERPL-MCNC: 47 MG/DL
HGB BLD-MCNC: 12.4 G/DL (ref 11.7–15.7)
LDLC SERPL CALC-MCNC: 55 MG/DL
MCH RBC QN AUTO: 27.8 PG (ref 26.5–33)
MCHC RBC AUTO-ENTMCNC: 33.3 G/DL (ref 31.5–36.5)
MCV RBC AUTO: 83 FL (ref 78–100)
MICROALBUMIN UR-MCNC: 22 MG/L
MICROALBUMIN/CREAT UR: 15.1 MG/G CR (ref 0–25)
NONHDLC SERPL-MCNC: 86 MG/DL
PLATELET # BLD AUTO: 229 10E9/L (ref 150–450)
POTASSIUM SERPL-SCNC: 3.5 MMOL/L (ref 3.4–5.3)
PROT SERPL-MCNC: 6.6 G/DL (ref 6.8–8.8)
RBC # BLD AUTO: 4.46 10E12/L (ref 3.8–5.2)
SODIUM SERPL-SCNC: 137 MMOL/L (ref 133–144)
TRIGL SERPL-MCNC: 153 MG/DL
TSH SERPL DL<=0.005 MIU/L-ACNC: 1.45 MU/L (ref 0.4–4)
WBC # BLD AUTO: 7.2 10E9/L (ref 4–11)

## 2018-05-18 PROCEDURE — 86803 HEPATITIS C AB TEST: CPT | Performed by: INTERNAL MEDICINE

## 2018-05-18 PROCEDURE — 83036 HEMOGLOBIN GLYCOSYLATED A1C: CPT | Performed by: INTERNAL MEDICINE

## 2018-05-18 PROCEDURE — 36415 COLL VENOUS BLD VENIPUNCTURE: CPT | Performed by: INTERNAL MEDICINE

## 2018-05-18 PROCEDURE — 80053 COMPREHEN METABOLIC PANEL: CPT | Performed by: INTERNAL MEDICINE

## 2018-05-18 PROCEDURE — 85027 COMPLETE CBC AUTOMATED: CPT | Performed by: INTERNAL MEDICINE

## 2018-05-18 PROCEDURE — 82043 UR ALBUMIN QUANTITATIVE: CPT | Performed by: INTERNAL MEDICINE

## 2018-05-18 PROCEDURE — 84443 ASSAY THYROID STIM HORMONE: CPT | Performed by: INTERNAL MEDICINE

## 2018-05-18 PROCEDURE — 80061 LIPID PANEL: CPT | Performed by: INTERNAL MEDICINE

## 2018-05-20 PROBLEM — I10 WHITE COAT SYNDROME WITH HYPERTENSION: Status: ACTIVE | Noted: 2018-05-20

## 2018-05-20 NOTE — PROGRESS NOTES
Dear Isabella,   Here are your recent results which are within the expected range.  Please continue with your current plan of care.     Please call or Mychart to our office if you have further questions.     Jennifer Thakur MD-PhD

## 2018-05-20 NOTE — PROGRESS NOTES
Dear Isabella,   Here are your recent results.   -- lipid panel is acceptable.  -- electrolyte panel and liver functions were acceptable.  -- A1c is very high.  -- we'll discuss medication adjustment tomorrow at your appointment.     Please call or Mychart to our office if you have further questions.     Jennifer Thakur MD-PhD

## 2018-05-21 ENCOUNTER — RADIANT APPOINTMENT (OUTPATIENT)
Dept: MAMMOGRAPHY | Facility: CLINIC | Age: 68
End: 2018-05-21
Attending: INTERNAL MEDICINE
Payer: COMMERCIAL

## 2018-05-21 ENCOUNTER — RADIANT APPOINTMENT (OUTPATIENT)
Dept: BONE DENSITY | Facility: CLINIC | Age: 68
End: 2018-05-21
Attending: INTERNAL MEDICINE
Payer: COMMERCIAL

## 2018-05-21 ENCOUNTER — OFFICE VISIT (OUTPATIENT)
Dept: PEDIATRICS | Facility: CLINIC | Age: 68
End: 2018-05-21
Payer: COMMERCIAL

## 2018-05-21 VITALS
HEART RATE: 61 BPM | DIASTOLIC BLOOD PRESSURE: 60 MMHG | OXYGEN SATURATION: 100 % | SYSTOLIC BLOOD PRESSURE: 128 MMHG | HEIGHT: 66 IN | TEMPERATURE: 97.7 F | BODY MASS INDEX: 26.66 KG/M2 | WEIGHT: 165.9 LBS

## 2018-05-21 DIAGNOSIS — E11.65 TYPE 2 DIABETES MELLITUS WITH HYPERGLYCEMIA, WITH LONG-TERM CURRENT USE OF INSULIN (H): Primary | ICD-10-CM

## 2018-05-21 DIAGNOSIS — Z79.4 TYPE 2 DIABETES MELLITUS WITH HYPERGLYCEMIA, WITH LONG-TERM CURRENT USE OF INSULIN (H): Primary | ICD-10-CM

## 2018-05-21 DIAGNOSIS — Z78.0 MENOPAUSE: ICD-10-CM

## 2018-05-21 DIAGNOSIS — Z12.31 ENCOUNTER FOR SCREENING MAMMOGRAM FOR BREAST CANCER: ICD-10-CM

## 2018-05-21 PROCEDURE — 99213 OFFICE O/P EST LOW 20 MIN: CPT | Performed by: INTERNAL MEDICINE

## 2018-05-21 PROCEDURE — 77063 BREAST TOMOSYNTHESIS BI: CPT | Performed by: RADIOLOGY

## 2018-05-21 PROCEDURE — 77067 SCR MAMMO BI INCL CAD: CPT | Performed by: RADIOLOGY

## 2018-05-21 PROCEDURE — 77081 DXA BONE DENSITY APPENDICULR: CPT | Performed by: RADIOLOGY

## 2018-05-21 PROCEDURE — 77080 DXA BONE DENSITY AXIAL: CPT | Mod: 59 | Performed by: RADIOLOGY

## 2018-05-21 NOTE — PATIENT INSTRUCTIONS
Make appointment(s) for:   -- diabetes follow up with non fasting lab in 1 month.       You may increase Lantus by 2 units every 3-4 days if morning fasting glucose is consistently above 150.     Stop Metformin.       Medication(s) prescribed today:    Orders Placed This Encounter   Medications     insulin glargine (LANTUS SOLOSTAR) 100 UNIT/ML pen     Sig: Inject 10 Units Subcutaneous At Bedtime     Dispense:  9 mL     Refill:  1     blood glucose monitoring (NO BRAND SPECIFIED) test strip     Sig: Use to test blood sugars 3 times daily or as directed     Dispense:  300 strip     Refill:  3     blood glucose monitoring (NO BRAND SPECIFIED) meter device kit     Sig: Use to test blood sugar 3 times daily or as directed.     Dispense:  1 kit     Refill:  0     blood glucose (NO BRAND SPECIFIED) lancets standard     Sig: Use to test blood sugar 3 times daily or as directed.     Dispense:  300 each     Refill:  3     insulin pen needle (BD BRITTON U/F) 32G X 4 MM     Sig: Use 1 daily or as directed.     Dispense:  100 each     Refill:  3           Healthy Meals for Diabetes     A healthcare provider will help you develop a meal plan that fits your needs.   Ask your healthcare team to help you make a meal plan that fits your needs. Your meal plan tells you when to eat your meals and snacks, what kinds of foods to eat, and how much of each food to eat. You don t have to give up all the foods you like. But you do need to follow some guidelines.  Choose healthy carbohydrates  Starches, sugars, and fiber are all types of carbohydrates. Fiber can help lower your cholesterol and triglycerides. Fiber is also healthy for your heart. You should have 20 to 35 grams of total fiber each day. Fiber-rich foods include:    Whole-grain breads and cereals    Bulgur wheat    Brown rice       Whole-wheat pasta    Fruits and vegetables    Dry beans, and peas   Keep track of the amount of carbohydrates you eat. This can help you keep the right  balance of physical activity and medicine. The amount of carbohydrates needed will vary for each person. It depends on many things such as your health, the medicines you take, and how active you are. Your healthcare team will help you figure out the right amount of carbohydrates for you. You may start with around 45 to 60 grams of carbohydrates per meal, depending on your situation.   Here are some examples of foods containing about 15 grams of carbohydrates (1 serving of carbohydrates):    1/2 cup of canned or frozen fruit    A small piece of fresh fruit (4 ounces)    1 slice of bread    1/2 cup of oatmeal    1/3 cup of rice    4 to 6 crackers    1/2 English muffin    1/2 cup of black beans    1/4 of a large baked potato (3 ounces)    2/3 cup of plain fat-free yogurt    1 cup of soup    1/2 cup of casserole    6 chicken nuggets    2-inch-square brownie or cake without frosting    2 small cookies    1/2 cup of ice cream or sherbet  Choose healthy protein foods  Eating protein that is low in fat can help you control your weight. It also helps keep your heart healthy. Low-fat protein foods include:    Fish    Plant proteins, such as dry beans and peas, nuts, and soy products like tofu and soymilk    Lean meat with all visible fat removed    Poultry with the skin removed    Low-fat or nonfat milk, cheese, and yogurt  Limit unhealthy fats and sugar  Saturated and trans fats are unhealthy for your heart. They raise LDL (bad) cholesterol. Fat is also high in calories, so it can make you gain weight. To cut down on unhealthy fats and sugar, limit these foods:    Butter or margarine    Palm and palm kernel oils and coconut oil    Cream    Cheese    Botello    Lunch meats       Ice cream    Sweet bakery goods such as pies, muffins, and donuts    Jams and jellies    Candy bars    Regular sodas   How much to eat  The amount of food you eat affects your blood sugar. It also affects your weight. Your healthcare team will tell you  how much of each type of food you should eat.    Use measuring cups and spoons and a food scale to measure serving sizes.    Learn what a correct serving size looks like on your plate. This will help when you are away from home and can t measure your servings.    Eat only the number of servings given on your meal plan for each food. Don t take seconds.    Learn to read food labels. Be sure to look at serving size, total carbohydrates, fiber, calories, sugar, and saturated and trans fats. Look for healthier alternatives to foods that have added sugar.    Plan ahead for parties so you can still have a good time without going overboard with unhealthy food choices. Set a good example yourself by bringing a healthy dish to pot lucks.   Choose healthy snacks  When it comes to snacks, we usually think about foods with added sugar and fats. But there are many other options for healthier snack choices. Here are a few snack ideas to choose from:  Snacks with less than 5 grams of carbohydrates    1 piece of string cheese    3 celery sticks plus 1 tablespoon of peanut butter    5 cherry tomatoes plus 1 tablespoon of ranch dressing    1 hard-boiled egg    1/4 cup of fresh blueberries     5 baby carrots    1 cup of light popcorn    1/2 cup of sugar-free gelatin    15 almonds  Snacks with about 10 to 20 grams of carbohydrates    1/3 cup of hummus plus 1 cup of fresh cut nonstarchy vegetables (carrots, green peppers, broccoli, celery, or a combination)    1/2 cup of fresh or canned fruit plus 1/4 cup of cottage cheese    1/2 cup of tuna salad with 4 crackers    2 rice cakes and a tablespoon of peanut butter    1 small apple or orange    3 cups light popcorn    1/2 of a turkey sandwich (1 slice of whole-wheat bread, 2 ounces of turkey, and mustard)  Portion sizes are important to controlling your blood sugar and staying at a healthy weight. Stock up on healthy snack items so you always have them on hand.  When to eat  Your meal plan  will likely include breakfast, lunch, dinner, and some snacks.    Try to eat your meals and snacks at about the same times each day.    Eat all your meals and snacks. Skipping a meal or snack can make your blood sugar drop too low. It can also cause you to eat too much at the next meal or snack. Then your blood sugar could get too high.  Date Last Reviewed: 7/1/2016 2000-2017 [employer]. [address] [place] [address], [place] [address]. All rights reserved. This information is not intended as a substitute for professional medical care. Always follow your healthcare professional's instructions.

## 2018-05-21 NOTE — MR AVS SNAPSHOT
After Visit Summary   5/21/2018    Isabella Mcginnis    MRN: [id number]           Patient Information     Date Of Birth          1950        Visit Information        Provider Department      5/21/2018 11:30 AM Jennifer Thakur MD PhD [facility]        Today's Diagnoses     Type 2 diabetes mellitus with hyperglycemia, with long-term current use of insulin (H)    -  1      Care Instructions    Make appointment(s) for:   -- diabetes follow up with non fasting lab in 1 month.       You may increase Lantus by 2 units every 3-4 days if morning fasting glucose is consistently above 150.     Stop Metformin.       Medication(s) prescribed today:    Orders Placed This Encounter   Medications     insulin glargine (LANTUS SOLOSTAR) 100 UNIT/ML pen     Sig: Inject 10 Units Subcutaneous At Bedtime     Dispense:  9 mL     Refill:  1     blood glucose monitoring (NO BRAND SPECIFIED) test strip     Sig: Use to test blood sugars 3 times daily or as directed     Dispense:  300 strip     Refill:  3     blood glucose monitoring (NO BRAND SPECIFIED) meter device kit     Sig: Use to test blood sugar 3 times daily or as directed.     Dispense:  1 kit     Refill:  0     blood glucose (NO BRAND SPECIFIED) lancets standard     Sig: Use to test blood sugar 3 times daily or as directed.     Dispense:  300 each     Refill:  3     insulin pen needle (BD BRITTON U/F) 32G X 4 MM     Sig: Use 1 daily or as directed.     Dispense:  100 each     Refill:  3           Healthy Meals for Diabetes     A healthcare provider will help you develop a meal plan that fits your needs.   Ask your healthcare team to help you make a meal plan that fits your needs. Your meal plan tells you when to eat your meals and snacks, what kinds of foods to eat, and how much of each food to eat. You don t have to give up all the foods you like. But you do need to follow some guidelines.  Choose healthy carbohydrates  Starches, sugars, and fiber are  all types of carbohydrates. Fiber can help lower your cholesterol and triglycerides. Fiber is also healthy for your heart. You should have 20 to 35 grams of total fiber each day. Fiber-rich foods include:    Whole-grain breads and cereals    Bulgur wheat    Brown rice       Whole-wheat pasta    Fruits and vegetables    Dry beans, and peas   Keep track of the amount of carbohydrates you eat. This can help you keep the right balance of physical activity and medicine. The amount of carbohydrates needed will vary for each person. It depends on many things such as your health, the medicines you take, and how active you are. Your healthcare team will help you figure out the right amount of carbohydrates for you. You may start with around 45 to 60 grams of carbohydrates per meal, depending on your situation.   Here are some examples of foods containing about 15 grams of carbohydrates (1 serving of carbohydrates):    1/2 cup of canned or frozen fruit    A small piece of fresh fruit (4 ounces)    1 slice of bread    1/2 cup of oatmeal    1/3 cup of rice    4 to 6 crackers    1/2 English muffin    1/2 cup of black beans    1/4 of a large baked potato (3 ounces)    2/3 cup of plain fat-free yogurt    1 cup of soup    1/2 cup of casserole    6 chicken nuggets    2-inch-square brownie or cake without frosting    2 small cookies    1/2 cup of ice cream or sherbet  Choose healthy protein foods  Eating protein that is low in fat can help you control your weight. It also helps keep your heart healthy. Low-fat protein foods include:    Fish    Plant proteins, such as dry beans and peas, nuts, and soy products like tofu and soymilk    Lean meat with all visible fat removed    Poultry with the skin removed    Low-fat or nonfat milk, cheese, and yogurt  Limit unhealthy fats and sugar  Saturated and trans fats are unhealthy for your heart. They raise LDL (bad) cholesterol. Fat is also high in calories, so it can make you gain weight. To  cut down on unhealthy fats and sugar, limit these foods:    Butter or margarine    Palm and palm kernel oils and coconut oil    Cream    Cheese    Botello    Lunch meats       Ice cream    Sweet bakery goods such as pies, muffins, and donuts    Jams and jellies    Candy bars    Regular sodas   How much to eat  The amount of food you eat affects your blood sugar. It also affects your weight. Your healthcare team will tell you how much of each type of food you should eat.    Use measuring cups and spoons and a food scale to measure serving sizes.    Learn what a correct serving size looks like on your plate. This will help when you are away from home and can t measure your servings.    Eat only the number of servings given on your meal plan for each food. Don t take seconds.    Learn to read food labels. Be sure to look at serving size, total carbohydrates, fiber, calories, sugar, and saturated and trans fats. Look for healthier alternatives to foods that have added sugar.    Plan ahead for parties so you can still have a good time without going overboard with unhealthy food choices. Set a good example yourself by bringing a healthy dish to pot lucks.   Choose healthy snacks  When it comes to snacks, we usually think about foods with added sugar and fats. But there are many other options for healthier snack choices. Here are a few snack ideas to choose from:  Snacks with less than 5 grams of carbohydrates    1 piece of string cheese    3 celery sticks plus 1 tablespoon of peanut butter    5 cherry tomatoes plus 1 tablespoon of ranch dressing    1 hard-boiled egg    1/4 cup of fresh blueberries     5 baby carrots    1 cup of light popcorn    1/2 cup of sugar-free gelatin    15 almonds  Snacks with about 10 to 20 grams of carbohydrates    1/3 cup of hummus plus 1 cup of fresh cut nonstarchy vegetables (carrots, green peppers, broccoli, celery, or a combination)    1/2 cup of fresh or canned fruit plus 1/4 cup of cottage  cheese    1/2 cup of tuna salad with 4 crackers    2 rice cakes and a tablespoon of peanut butter    1 small apple or orange    3 cups light popcorn    1/2 of a turkey sandwich (1 slice of whole-wheat bread, 2 ounces of turkey, and mustard)  Portion sizes are important to controlling your blood sugar and staying at a healthy weight. Stock up on healthy snack items so you always have them on hand.  When to eat  Your meal plan will likely include breakfast, lunch, dinner, and some snacks.    Try to eat your meals and snacks at about the same times each day.    Eat all your meals and snacks. Skipping a meal or snack can make your blood sugar drop too low. It can also cause you to eat too much at the next meal or snack. Then your blood sugar could get too high.  Date Last Reviewed: 7/1/2016 2000-2017 [employer]. [address] [place] [address]. All rights reserved. This information is not intended as a substitute for professional medical care. Always follow your healthcare professional's instructions.                Follow-ups after your visit        Your next 10 appointments already scheduled     May 21, 2018  2:30 PM CDT   DX WRIST/HEEL/RADIUS with MGDX1   [facility])    [address] [place] [id number] [phone]           Please do not take any of the following 24 hours prior to the day of your exam: vitamins, calcium tablets, antacids.  If possible, please wear clothes without metal (snaps, zippers). A sweatsuit works well.            Jun 25, 2018  1:50 PM CDT   LAB with Jennifer Thakur MD PhD   [facility])    [address] [place] [id number] [phone]           Please do not eat 10-12 hours before your appointment if you are coming in fasting for labs on lipids, cholesterol, or glucose (sugar). This does not apply to pregnant women. Water, hot tea and black  coffee (with nothing added) are okay. Do not drink other fluids, diet soda or chew gum.            Jun 25, 2018  2:10 PM CDT   [facility] Visit with Jennifer Thakur MD PhD   [facility]    [address] [address] [place] [id number] [phone]              Who to contact     If you have questions or need follow up information about today's clinic visit or your schedule please contact [facility] directly at [phone].  Normal or non-critical lab and imaging results will be communicated to you by Souktel[employer], letter or phone within 4 business days after the clinic has received the results. If you do not hear from us within 7 days, please contact the clinic through [employer] or phone. If you have a critical or abnormal lab result, we will notify you by phone as soon as possible.  Submit refill requests through [employer] or call your pharmacy and they will forward the refill request to us. Please allow 3 business days for your refill to be completed.          Additional Information About Your Visit        [employer] Information     [employer] gives you secure access to your electronic health record. If you see a primary care provider, you can also send messages to your care team and make appointments. If you have questions, please call your primary care clinic.  If you do not have a primary care provider, please call [phone] and they will assist you.      [employer] is an electronic gateway that provides easy, online access to your medical records. With [employer], you can request a clinic appointment, read your test results, renew a prescription or communicate with your care team.     To access your existing account, please contact your [facility] or call [phone] for assistance.        Care EveryWhere ID     This is your Care EveryWhere ID. This could be used by other organizations to access your [facility]  "records  [id number]        Your Vitals Were     Pulse Temperature Height Last Period Pulse Oximetry BMI (Body Mass Index)    61 97.7  F (36.5  C) (Temporal) 5' 5.75\" (1.67 m) 12/21/2007 100% 26.98 kg/m2       Blood Pressure from Last 3 Encounters:   05/21/18 128/60   05/17/18 188/84   02/24/17 151/75    Weight from Last 3 Encounters:   05/21/18 165 lb 14.4 oz (75.3 kg)   05/17/18 166 lb (75.3 kg)   02/24/17 160 lb 11.5 oz (72.9 kg)              Today, you had the following     No orders found for display         Today's Medication Changes          These changes are accurate as of 5/21/18 12:18 PM.  If you have any questions, ask your nurse or doctor.               Start taking these medicines.        Dose/Directions    blood glucose lancets standard   Commonly known as:  no brand specified   Used for:  Type 2 diabetes mellitus with hyperglycemia, with long-term current use of insulin (H)   Started by:  Jennifer Thakur MD PhD        Use to test blood sugar 3 times daily or as directed.   Quantity:  300 each   Refills:  3       insulin glargine 100 UNIT/ML injection   Commonly known as:  LANTUS SOLOSTAR   Used for:  Type 2 diabetes mellitus with hyperglycemia, with long-term current use of insulin (H)   Started by:  Jennifer Thakur MD PhD        Dose:  10 Units   Inject 10 Units Subcutaneous At Bedtime   Quantity:  9 mL   Refills:  1       insulin pen needle 32G X 4 MM   Commonly known as:  BD BRITTON U/F   Used for:  Type 2 diabetes mellitus with hyperglycemia, with long-term current use of insulin (H)   Started by:  Jennifer Thakur MD PhD        Use 1 daily or as directed.   Quantity:  100 each   Refills:  3         These medicines have changed or have updated prescriptions.        Dose/Directions    * blood glucose monitoring meter device kit   This may have changed:  Another medication with the same name was added. Make sure you understand how and when to take each.   Used for:  Hypertension goal BP (blood pressure) < 130/80, " Type 2 diabetes, HbA1c goal < 7% (H), Hyperlipidemia LDL goal <100, Need for prophylactic vaccination and inoculation against influenza   Changed by:  Jennifer Thakur MD PhD        Use to test blood sugars 3-4  times daily or as directed.   Quantity:  1 kit   Refills:  0       * blood glucose monitoring meter device kit   Commonly known as:  no brand specified   This may have changed:  You were already taking a medication with the same name, and this prescription was added. Make sure you understand how and when to take each.   Used for:  Type 2 diabetes mellitus with hyperglycemia, with long-term current use of insulin (H)   Changed by:  Jennifer Thakur MD PhD        Use to test blood sugar 3 times daily or as directed.   Quantity:  1 kit   Refills:  0       * blood glucose monitoring test strip   Commonly known as:  ACCU-CHEK COMPACT DRUM   This may have changed:  Another medication with the same name was added. Make sure you understand how and when to take each.   Used for:  Type 2 diabetes, HbA1c goal < 7% (H)   Changed by:  Jennifer Thakur MD PhD        Use to test blood sugars 3-4 times daily or as directed.   Quantity:  400 strip   Refills:  3       * blood glucose monitoring test strip   Commonly known as:  no brand specified   This may have changed:  You were already taking a medication with the same name, and this prescription was added. Make sure you understand how and when to take each.   Used for:  Type 2 diabetes mellitus with hyperglycemia, with long-term current use of insulin (H)   Changed by:  Jennifer Thakur MD PhD        Use to test blood sugars 3 times daily or as directed   Quantity:  300 strip   Refills:  3       * Notice:  This list has 4 medication(s) that are the same as other medications prescribed for you. Read the directions carefully, and ask your doctor or other care provider to review them with you.      Stop taking these medicines if you haven't already. Please contact your care team if you have  questions.     metFORMIN 500 MG 24 hr tablet   Commonly known as:  GLUCOPHAGE-XR   Stopped by:  Jennifer Thakur MD PhD                Where to get your medicines      These medications were sent to [facility] - [place], [place] - [address] [facility] [address]  [address] [facility] [address], [place] [address]     Phone:  [phone]     blood glucose lancets standard    blood glucose monitoring meter device kit    blood glucose monitoring test strip    insulin glargine 100 UNIT/ML injection    insulin pen needle 32G X 4 MM                Primary Care Provider Office Phone # Fax #    Kaitlynn [facility] [phone]       [address]  [facility] [address]        Equal Access to Services     JAMAICA CASTILLO : Hadii aad ku hadasho Soomaali, waaxda luqadaha, qaybta kaalmada adeegyada, waxay melviin haysullyn chino stock . So [facility] [phone].    ATENCIÓN: Si habla español, tiene a singh disposición servicios gratuitos de asistencia lingüística. [facility] [phone].    We comply with applicable federal civil rights laws and [place] laws. We do not discriminate on the basis of race, color, national origin, age, disability, sex, sexual orientation, or gender identity.            Thank you!     Thank you for choosing [facility]  for your care. Our goal is always to provide you with excellent care. Hearing back from our patients is one way we can continue to improve our services. Please take a few minutes to complete the written survey that you may receive in the mail after your visit with us. Thank you!             Your Updated Medication List - Protect others around you: Learn how to safely use, store and throw away your medicines at www.disposemymeds.org.          This list is accurate as of 5/21/18 12:18 PM.  Always use your most recent med list.                   Brand Name Dispense Instructions for use Diagnosis    amLODIPine 10 MG tablet    NORVASC    90  tablet    Take 1 tablet (10 mg) by mouth daily    Hypertension goal BP (blood pressure) < 130/80       aspirin 81 MG tablet      1 TABLET DAILY        atenolol 100 MG tablet    TENORMIN    90 tablet    Take 1 tablet (100 mg) by mouth daily    Essential hypertension       atorvastatin 40 MG tablet    LIPITOR    90 tablet    Take 1 tablet (40 mg) by mouth daily    Hyperlipidemia LDL goal <100       blood glucose lancets standard    no brand specified    300 each    Use to test blood sugar 3 times daily or as directed.    Type 2 diabetes mellitus with hyperglycemia, with long-term current use of insulin (H)       blood glucose monitoring lancets     300 each    Use to check blood sugar 3-4 times daily    Type 2 diabetes, HbA1c goal < 7% (H)       * blood glucose monitoring meter device kit     1 kit    Use to test blood sugars 3-4  times daily or as directed.    Hypertension goal BP (blood pressure) < 130/80, Type 2 diabetes, HbA1c goal < 7% (H), Hyperlipidemia LDL goal <100, Need for prophylactic vaccination and inoculation against influenza       * blood glucose monitoring meter device kit    no brand specified    1 kit    Use to test blood sugar 3 times daily or as directed.    Type 2 diabetes mellitus with hyperglycemia, with long-term current use of insulin (H)       * blood glucose monitoring test strip    ACCU-CHEK COMPACT DRUM    400 strip    Use to test blood sugars 3-4 times daily or as directed.    Type 2 diabetes, HbA1c goal < 7% (H)       * blood glucose monitoring test strip    no brand specified    300 strip    Use to test blood sugars 3 times daily or as directed    Type 2 diabetes mellitus with hyperglycemia, with long-term current use of insulin (H)       calcium-vitamin D 600-400 MG-UNIT per tablet    calcium 600 + D    90 tablet    Take 1 tablet by mouth daily    Type 2 diabetes mellitus without complication, without long-term current use of insulin (H)       glipiZIDE 10 MG tablet    GLUCOTROL     180 tablet    Take 1 tablet (10 mg) by mouth 2 times daily (before meals)    Type 2 diabetes mellitus without complication, without long-term current use of insulin (H)       insulin glargine 100 UNIT/ML injection    LANTUS SOLOSTAR    9 mL    Inject 10 Units Subcutaneous At Bedtime    Type 2 diabetes mellitus with hyperglycemia, with long-term current use of insulin (H)       insulin pen needle 32G X 4 MM    BD BRITTON U/F    100 each    Use 1 daily or as directed.    Type 2 diabetes mellitus with hyperglycemia, with long-term current use of insulin (H)       losartan-hydrochlorothiazide 100-25 MG per tablet    HYZAAR    90 tablet    Take 1 tablet by mouth daily    Benign essential hypertension       MULTIVITAMIN TABS   OR      1 TABLET DAILY        * Notice:  This list has 4 medication(s) that are the same as other medications prescribed for you. Read the directions carefully, and ask your doctor or other care provider to review them with you.

## 2018-05-21 NOTE — PROGRESS NOTES
SUBJECTIVE:   Isabella Mcginnis is a 68 year old female who presents to clinic today for the following health issues:      Diabetes Follow-up      Patient is checking blood sugars: not at all    Diabetic concerns: None and blood sugar frequently over 200     Symptoms of hypoglycemia (low blood sugar): none     Paresthesias (numbness or burning in feet) or sores: No     Date of last diabetic eye exam: April 2018    BP Readings from Last 2 Encounters:   05/17/18 188/84   02/24/17 151/75     Hemoglobin A1C (%)   Date Value   05/18/2018 13.0 (H)   12/02/2016 11.7     LDL Cholesterol Calculated (mg/dL)   Date Value   05/18/2018 55   05/20/2013 101     LDL Cholesterol Direct (mg/dL)   Date Value   06/15/2015 85   05/20/2014 80       Amount of exercise or physical activity: 4-5 days/week for an average of 15-30 minutes    Problems taking medications regularly: No    Medication side effects: none    Diet: diabetic      Patient had labs done last Friday and here to follow up.   A1c is quite high. Pt reported being on insulin in the past. Went off since she lost 100 lbs. Her weight is stable. She is not eating right. Previously didn't do well with Victoza and Actos.    She wants to go of metformin if possible, gives her the diarrhea. Not adverse to going back on statin.     ROS:  Constitutional, HEENT, cardiovascular, pulmonary, gi and gu systems are negative, except as otherwise noted.         Current Outpatient Prescriptions on File Prior to Visit:  amLODIPine (NORVASC) 10 MG tablet Take 1 tablet (10 mg) by mouth daily   ASPIRIN 81 MG OR TABS 1 TABLET DAILY   atenolol (TENORMIN) 100 MG tablet Take 1 tablet (100 mg) by mouth daily   atorvastatin (LIPITOR) 40 MG tablet Take 1 tablet (40 mg) by mouth daily   calcium-vitamin D (CALCIUM 600 + D) 600-400 MG-UNIT per tablet Take 1 tablet by mouth daily   glipiZIDE (GLUCOTROL) 10 MG tablet Take 1 tablet (10 mg) by mouth 2 times daily (before meals)   losartan-hydrochlorothiazide  "(HYZAAR) 100-25 MG per tablet Take 1 tablet by mouth daily   MULTIVITAMIN TABS   OR 1 TABLET DAILY   Blood Glucose Monitoring Suppl (ACCU-CHEK COMPACT CARE KIT) KIT Use to test blood sugars 3-4  times daily or as directed. (Patient not taking: Reported on 5/21/2018)   glucose blood VI test strips (ACCU-CHEK COMPACT DRUM TEST STRIPS) strip Use to test blood sugars 3-4 times daily or as directed. (Patient not taking: Reported on 5/21/2018)   SOFTCLIX LANCETS MISC Use to check blood sugar 3-4 times daily (Patient not taking: Reported on 5/21/2018)     No current facility-administered medications on file prior to visit.        Patient Active Problem List   Diagnosis     Hypertension goal BP (blood pressure) < 130/80     Rheumatic fever     Obesity     TYPE 2 DIABETES, HBA1C GOAL < 7%     HYPERLIPIDEMIA LDL GOAL <100     White coat syndrome with hypertension     Past Surgical History:   Procedure Laterality Date     NO HISTORY OF SURGERY         Social History   Substance Use Topics     Smoking status: Never Smoker     Smokeless tobacco: Never Used     Alcohol use No     Family History   Problem Relation Age of Onset     DIABETES Mother      Hypertension Mother      CEREBROVASCULAR DISEASE Mother      C.A.D. Father      Asthma No family hx of      Breast Cancer No family hx of      Cancer - colorectal No family hx of      Prostate Cancer No family hx of              Problem list, Medication list, Allergies, and Medical/Social/Surgical histories reviewed in [facility] and updated as appropriate.    OBJECTIVE:                                                    /60 (BP Location: Right arm, Patient Position: Sitting, Cuff Size: Adult Regular)  Pulse 61  Temp 97.7  F (36.5  C) (Temporal)  Ht 5' 5.75\" (1.67 m)  Wt 165 lb 14.4 oz (75.3 kg)  LMP 12/21/2007  SpO2 100%  BMI 26.98 kg/m2    GENERAL: healthy, alert and no distress    Orders Only on 05/18/2018   Component Date Value Ref Range Status     WBC 05/18/2018 7.2  4.0 " - 11.0 10e9/L Final     RBC Count 05/18/2018 4.46  3.8 - 5.2 10e12/L Final     Hemoglobin 05/18/2018 12.4  11.7 - 15.7 g/dL Final     Hematocrit 05/18/2018 37.2  35.0 - 47.0 % Final     MCV 05/18/2018 83  78 - 100 fl Final     MCH 05/18/2018 27.8  26.5 - 33.0 pg Final     MCHC 05/18/2018 33.3  31.5 - 36.5 g/dL Final     RDW 05/18/2018 12.1  10.0 - 15.0 % Final     Platelet Count 05/18/2018 229  150 - 450 10e9/L Final     Sodium 05/18/2018 137  133 - 144 mmol/L Final     Potassium 05/18/2018 3.5  3.4 - 5.3 mmol/L Final     Chloride 05/18/2018 101  94 - 109 mmol/L Final     Carbon Dioxide 05/18/2018 30  20 - 32 mmol/L Final     Anion Gap 05/18/2018 6  3 - 14 mmol/L Final     Glucose 05/18/2018 364* 70 - 99 mg/dL Final    Fasting specimen     Urea Nitrogen 05/18/2018 15  7 - 30 mg/dL Final     Creatinine 05/18/2018 0.85  0.52 - 1.04 mg/dL Final     GFR Estimate 05/18/2018 67  >60 mL/min/1.7m2 Final    Non  GFR Calc     GFR Estimate If Black 05/18/2018 81  >60 mL/min/1.7m2 Final    African American GFR Calc     Calcium 05/18/2018 9.2  8.5 - 10.1 mg/dL Final     Bilirubin Total 05/18/2018 0.7  0.2 - 1.3 mg/dL Final     Albumin 05/18/2018 3.7  3.4 - 5.0 g/dL Final     Protein Total 05/18/2018 6.6* 6.8 - 8.8 g/dL Final     Alkaline Phosphatase 05/18/2018 128  40 - 150 U/L Final     ALT 05/18/2018 16  0 - 50 U/L Final     AST 05/18/2018 9  0 - 45 U/L Final     Hemoglobin A1C 05/18/2018 13.0* 0 - 5.6 % Final    Comment: Normal <5.7% Prediabetes 5.7-6.4%  Diabetes 6.5% or higher - adopted from ADA   consensus guidelines.       Cholesterol 05/18/2018 133  <200 mg/dL Final     Triglycerides 05/18/2018 153* <150 mg/dL Final    Comment: Borderline high:  150-199 mg/dl  High:             200-499 mg/dl  Very high:       >499 mg/dl  Fasting specimen       HDL Cholesterol 05/18/2018 47* >49 mg/dL Final     LDL Cholesterol Calculated 05/18/2018 55  <100 mg/dL Final    Desirable:       <100 mg/dl     Non HDL Cholesterol  05/18/2018 86  <130 mg/dL Final     Creatinine Urine 05/18/2018 145  mg/dL Final     Albumin Urine mg/L 05/18/2018 22  mg/L Final     Albumin Urine mg/g Cr 05/18/2018 15.10  0 - 25 mg/g Cr Final     TSH 05/18/2018 1.45  0.40 - 4.00 mU/L Final     Hepatitis C Antibody 05/18/2018 Nonreactive  NR^Nonreactive Final    Comment: Assay performance characteristics have not been established for newborns,   infants, and children                ASSESSMENT/PLAN:                                                      68 year old female with the following diagnoses and treatment plan:      ICD-10-CM    1. Type 2 diabetes mellitus with hyperglycemia, with long-term current use of insulin (H) E11.65 insulin glargine (LANTUS SOLOSTAR) 100 UNIT/ML pen    Z79.4 blood glucose monitoring (NO BRAND SPECIFIED) test strip     blood glucose monitoring (NO BRAND SPECIFIED) meter device kit     blood glucose (NO BRAND SPECIFIED) lancets standard     insulin pen needle (BD BRITTON U/F) 32G X 4 MM       -- uncontrolled diabetes. Will discontinue metformin, and add Lantus. Continue glipizide. Declined nutritional counseling.   -- follow up in 1 month.     Will call or return to clinic if worsening or symptoms not improving as discussed.  See Patient Instructions.      Jennifer Thakur MD-PhD  [facility] [place]    (Note: Chart documentation was done in part with Dragon Voice Recognition software. Although reviewed after completion, some word and grammatical errors may remain.)

## 2018-05-22 NOTE — PROGRESS NOTES
Dear Isabella,   Here are your recent results.   -- The bone density test shows osteopenia. This is an intermediate category that is in between normal and osteoporosis.  People with osteopenia should work on taking in 9598-8622 mg of calcium with vitamin D daily. They should also be getting daily weight bearing exercise (walking works)    Please call or Mychart to our office if you have further questions.     Jennifer Thakur MD-PhD

## 2018-05-30 ENCOUNTER — TELEPHONE (OUTPATIENT)
Dept: PEDIATRICS | Facility: CLINIC | Age: 68
End: 2018-05-30

## 2018-05-30 DIAGNOSIS — E11.29 TYPE 2 DIABETES MELLITUS WITH MICROALBUMINURIA, WITHOUT LONG-TERM CURRENT USE OF INSULIN (H): Primary | ICD-10-CM

## 2018-05-30 DIAGNOSIS — R80.9 TYPE 2 DIABETES MELLITUS WITH MICROALBUMINURIA, WITHOUT LONG-TERM CURRENT USE OF INSULIN (H): Primary | ICD-10-CM

## 2018-05-30 NOTE — TELEPHONE ENCOUNTER
Clarifiation,     We were sent a quantity of 9 ml's. This medication comes in a package size of 15 ml's and we cannot break packages in mail order. Please resend with clarification.   Paige ARAMBULAA

## 2018-05-31 ENCOUNTER — TELEPHONE (OUTPATIENT)
Dept: PEDIATRICS | Facility: CLINIC | Age: 68
End: 2018-05-31

## 2018-05-31 DIAGNOSIS — Z79.4 TYPE 2 DIABETES MELLITUS WITH HYPERGLYCEMIA, WITH LONG-TERM CURRENT USE OF INSULIN (H): Primary | ICD-10-CM

## 2018-05-31 DIAGNOSIS — E11.65 TYPE 2 DIABETES MELLITUS WITH HYPERGLYCEMIA, WITH LONG-TERM CURRENT USE OF INSULIN (H): Primary | ICD-10-CM

## 2018-05-31 RX ORDER — INSULIN GLARGINE 100 [IU]/ML
10 INJECTION, SOLUTION SUBCUTANEOUS AT BEDTIME
Qty: 9 ML | Refills: 0 | Status: SHIPPED | OUTPATIENT
Start: 2018-05-31 | End: 2018-05-31

## 2018-05-31 RX ORDER — INSULIN GLARGINE 100 [IU]/ML
10 INJECTION, SOLUTION SUBCUTANEOUS AT BEDTIME
Qty: 15 ML | Refills: 0 | Status: SHIPPED | OUTPATIENT
Start: 2018-05-31 | End: 2018-06-25

## 2018-05-31 NOTE — TELEPHONE ENCOUNTER
Jhony states Lantus is not covered by insurance.  That's probably why she didn't get her mail order supply.   Basaglar is covered.    Resent RX to jhony and mail order.    Discussed with Dr. Thakur.  Verbal order:  Ok to fill basaglar.    Gabriela Cortez RN,   [facility], [facility]

## 2018-05-31 NOTE — TELEPHONE ENCOUNTER
Left message informing patient I sent in a 30 day supply of her lantus insulin to the [facility] in [place] while she waits for her mail order supply.    Gabriela Cortez RN,   [facility], [facility]

## 2018-06-22 ENCOUNTER — DOCUMENTATION ONLY (OUTPATIENT)
Dept: LAB | Facility: CLINIC | Age: 68
End: 2018-06-22

## 2018-06-22 DIAGNOSIS — R80.9 TYPE 2 DIABETES MELLITUS WITH MICROALBUMINURIA, WITHOUT LONG-TERM CURRENT USE OF INSULIN (H): ICD-10-CM

## 2018-06-22 DIAGNOSIS — E11.29 TYPE 2 DIABETES MELLITUS WITH MICROALBUMINURIA, WITHOUT LONG-TERM CURRENT USE OF INSULIN (H): ICD-10-CM

## 2018-06-22 DIAGNOSIS — E78.5 HYPERLIPIDEMIA LDL GOAL <100: Primary | ICD-10-CM

## 2018-06-25 ENCOUNTER — OFFICE VISIT (OUTPATIENT)
Dept: PEDIATRICS | Facility: CLINIC | Age: 68
End: 2018-06-25
Payer: COMMERCIAL

## 2018-06-25 VITALS
OXYGEN SATURATION: 98 % | DIASTOLIC BLOOD PRESSURE: 76 MMHG | TEMPERATURE: 97.3 F | BODY MASS INDEX: 27.49 KG/M2 | SYSTOLIC BLOOD PRESSURE: 150 MMHG | WEIGHT: 169 LBS | HEART RATE: 57 BPM

## 2018-06-25 DIAGNOSIS — Z79.4 TYPE 2 DIABETES MELLITUS WITH HYPERGLYCEMIA, WITH LONG-TERM CURRENT USE OF INSULIN (H): Primary | ICD-10-CM

## 2018-06-25 DIAGNOSIS — E11.65 TYPE 2 DIABETES MELLITUS WITH HYPERGLYCEMIA, WITH LONG-TERM CURRENT USE OF INSULIN (H): Primary | ICD-10-CM

## 2018-06-25 DIAGNOSIS — E78.5 HYPERLIPIDEMIA LDL GOAL <100: ICD-10-CM

## 2018-06-25 DIAGNOSIS — E11.29 TYPE 2 DIABETES MELLITUS WITH MICROALBUMINURIA, WITHOUT LONG-TERM CURRENT USE OF INSULIN (H): ICD-10-CM

## 2018-06-25 DIAGNOSIS — I10 WHITE COAT SYNDROME WITH HYPERTENSION: ICD-10-CM

## 2018-06-25 DIAGNOSIS — R80.9 TYPE 2 DIABETES MELLITUS WITH MICROALBUMINURIA, WITHOUT LONG-TERM CURRENT USE OF INSULIN (H): ICD-10-CM

## 2018-06-25 PROCEDURE — 99213 OFFICE O/P EST LOW 20 MIN: CPT | Performed by: INTERNAL MEDICINE

## 2018-06-25 RX ORDER — ATORVASTATIN CALCIUM 40 MG/1
40 TABLET, FILM COATED ORAL DAILY
Qty: 90 TABLET | Refills: 3 | Status: SHIPPED | OUTPATIENT
Start: 2018-06-25

## 2018-06-25 RX ORDER — INSULIN GLARGINE 100 [IU]/ML
46 INJECTION, SOLUTION SUBCUTANEOUS AT BEDTIME
Qty: 45 ML | Refills: 1 | Status: SHIPPED | OUTPATIENT
Start: 2018-06-25 | End: 2018-09-10

## 2018-06-25 NOTE — PATIENT INSTRUCTIONS
Make appointment(s) for:   -- diabetes educator and nutritionist  -- diabetes follow up in 2 months with A1c check.         Medication(s) prescribed today:    Orders Placed This Encounter   Medications     BASAGLAR 100 UNIT/ML injection     Sig: Inject 46 Units Subcutaneous At Bedtime     Dispense:  45 mL     Refill:  1     DISCONTD: canagliflozin (INVOKANA) 100 MG tablet     Sig: Take 1 tablet (100 mg) by mouth every morning (before breakfast)     Dispense:  90 tablet     Refill:  0     atorvastatin (LIPITOR) 40 MG tablet     Sig: Take 1 tablet (40 mg) by mouth daily     Dispense:  90 tablet     Refill:  3     canagliflozin (INVOKANA) 300 MG tablet     Sig: Take 1 tablet (300 mg) by mouth every morning (before breakfast)     Dispense:  90 tablet     Refill:  0     Changed dose to 300 mg.

## 2018-06-25 NOTE — PROGRESS NOTES
SUBJECTIVE:   Isabella Mcginnis is a 68 year old female who presents to clinic today for the following health issues:      Diabetes Follow-up      Patient is checking blood sugars: downloaded    Diabetic concerns: blood sugar frequently over 200     Symptoms of hypoglycemia (low blood sugar): none     Paresthesias (numbness or burning in feet) or sores: No     Date of last diabetic eye exam: UTD    BP Readings from Last 2 Encounters:   05/21/18 128/60   05/17/18 188/84     Hemoglobin A1C (%)   Date Value   05/18/2018 13.0 (H)   12/02/2016 11.7     LDL Cholesterol Calculated (mg/dL)   Date Value   05/18/2018 55   05/20/2013 101     LDL Cholesterol Direct (mg/dL)   Date Value   06/15/2015 85   05/20/2014 80       Diabetes Management Resources    Amount of exercise or physical activity: 4-5 days/week for an average of 15-30 minutes    Problems taking medications regularly: No    Medication side effects: none    Diet: regular (no restrictions)      She has increased Basaglar to 30 units, glucose remains above 200. Her glucometer has only 7 readings between 6/3/18 - 6/25/18. Glucose 242-500. The higher numbers were at bedtime.     Reports white coat hypertension.    ROS:  Constitutional, HEENT, cardiovascular, pulmonary, gi and gu systems are negative, except as otherwise noted.         Current Outpatient Prescriptions on File Prior to Visit:  amLODIPine (NORVASC) 10 MG tablet Take 1 tablet (10 mg) by mouth daily   ASPIRIN 81 MG OR TABS 1 TABLET DAILY   atenolol (TENORMIN) 100 MG tablet Take 1 tablet (100 mg) by mouth daily   calcium-vitamin D (CALCIUM 600 + D) 600-400 MG-UNIT per tablet Take 1 tablet by mouth daily   glipiZIDE (GLUCOTROL) 10 MG tablet Take 1 tablet (10 mg) by mouth 2 times daily (before meals)   losartan-hydrochlorothiazide (HYZAAR) 100-25 MG per tablet Take 1 tablet by mouth daily   MULTIVITAMIN TABS   OR 1 TABLET DAILY   blood glucose (NO BRAND SPECIFIED) lancets standard Use to test blood sugar 3  times daily or as directed.   blood glucose monitoring (NO BRAND SPECIFIED) meter device kit Use to test blood sugar 3 times daily or as directed.   blood glucose monitoring (NO BRAND SPECIFIED) test strip Use to test blood sugars 3 times daily or as directed   Blood Glucose Monitoring Suppl (ACCU-CHEK COMPACT CARE KIT) KIT Use to test blood sugars 3-4  times daily or as directed. (Patient not taking: Reported on 5/21/2018)   glucose blood VI test strips (ACCU-CHEK COMPACT DRUM TEST STRIPS) strip Use to test blood sugars 3-4 times daily or as directed. (Patient not taking: Reported on 5/21/2018)   insulin glargine (LANTUS SOLOSTAR) 100 UNIT/ML pen Inject 10 Units Subcutaneous At Bedtime (Patient not taking: Reported on 6/25/2018)   insulin pen needle (BD BRITTON U/F) 32G X 4 MM Use 1 daily or as directed.   SOFTCLIX LANCETS MISC Use to check blood sugar 3-4 times daily (Patient not taking: Reported on 5/21/2018)   [DISCONTINUED] atorvastatin (LIPITOR) 40 MG tablet Take 1 tablet (40 mg) by mouth daily   [DISCONTINUED] BASAGLAR 100 UNIT/ML injection Inject 10 Units Subcutaneous At Bedtime     No current facility-administered medications on file prior to visit.        Patient Active Problem List   Diagnosis     Hypertension goal BP (blood pressure) < 130/80     Rheumatic fever     Obesity     TYPE 2 DIABETES, HBA1C GOAL < 7%     HYPERLIPIDEMIA LDL GOAL <100     White coat syndrome with hypertension     Past Surgical History:   Procedure Laterality Date     NO HISTORY OF SURGERY         Social History   Substance Use Topics     Smoking status: Never Smoker     Smokeless tobacco: Never Used     Alcohol use No     Family History   Problem Relation Age of Onset     Diabetes Mother      Hypertension Mother      Cerebrovascular Disease Mother      C.A.D. Father      Asthma No family hx of      Breast Cancer No family hx of      Cancer - colorectal No family hx of      Prostate Cancer No family hx of              Problem list,  Medication list, Allergies, and Medical/Social/Surgical histories reviewed in [facility] and updated as appropriate.    OBJECTIVE:                                                    /76  Pulse 57  Temp 97.3  F (36.3  C) (Temporal)  Wt 169 lb (76.7 kg)  LMP 12/21/2007  SpO2 98%  BMI 27.49 kg/m2    GENERAL: healthy, alert and no distress        ASSESSMENT/PLAN:                                                      68 year old female with the following diagnoses and treatment plan:      ICD-10-CM    1. Type 2 diabetes mellitus with hyperglycemia, with long-term current use of insulin (H) E11.65 BASAGLAR 100 UNIT/ML injection    Z79.4 DIABETES EDUCATOR REFERRAL     Hemoglobin A1c     NUTRITION REFERRAL     canagliflozin (INVOKANA) 300 MG tablet     DISCONTINUED: canagliflozin (INVOKANA) 100 MG tablet     CANCELED: Hemoglobin A1c   2. Type 2 diabetes mellitus with microalbuminuria, without long-term current use of insulin (H) E11.29 BASAGLAR 100 UNIT/ML injection    R80.9 DIABETES EDUCATOR REFERRAL     Hemoglobin A1c     NUTRITION REFERRAL     canagliflozin (INVOKANA) 300 MG tablet     DISCONTINUED: canagliflozin (INVOKANA) 100 MG tablet   3. Hyperlipidemia LDL goal <100 E78.5 atorvastatin (LIPITOR) 40 MG tablet       -- increase her glucose to 46 units (0.6 u/kg/day), add Invokana 300 mg. Previously she was on Invokana without side effects. See diabetes educator and nutritionist.   -- hypertension: white coat syndrome. No change in her antihypertensives.   -- hyperlipidemia: Atorvastatin refilled.   -- follow up in 2 months with A1c      Will call or return to clinic if worsening or symptoms not improving as discussed.  See Patient Instructions.      Jennifer Thakur MD-PhD  [facility] – [place]    (Note: Chart documentation was done in part with Dragon Voice Recognition software. Although reviewed after completion, some word and grammatical errors may remain.)

## 2018-06-25 NOTE — MR AVS SNAPSHOT
After Visit Summary   6/25/2018    Isabella Mcginnis    MRN: [id number]           Patient Information     Date Of Birth          1950        Visit Information        Provider Department      6/25/2018 2:10 PM Jennifer Thakur MD PhD [facility]        Today's Diagnoses     Type 2 diabetes mellitus with hyperglycemia, with long-term current use of insulin (H)    -  1    Type 2 diabetes mellitus with microalbuminuria, without long-term current use of insulin (H)        Hyperlipidemia LDL goal <100          Care Instructions    Make appointment(s) for:   -- diabetes educator and nutritionist  -- diabetes follow up in 2 months with A1c check.         Medication(s) prescribed today:    Orders Placed This Encounter   Medications     BASAGLAR 100 UNIT/ML injection     Sig: Inject 46 Units Subcutaneous At Bedtime     Dispense:  45 mL     Refill:  1     DISCONTD: canagliflozin (INVOKANA) 100 MG tablet     Sig: Take 1 tablet (100 mg) by mouth every morning (before breakfast)     Dispense:  90 tablet     Refill:  0     atorvastatin (LIPITOR) 40 MG tablet     Sig: Take 1 tablet (40 mg) by mouth daily     Dispense:  90 tablet     Refill:  3     canagliflozin (INVOKANA) 300 MG tablet     Sig: Take 1 tablet (300 mg) by mouth every morning (before breakfast)     Dispense:  90 tablet     Refill:  0     Changed dose to 300 mg.                   Follow-ups after your visit        Additional Services     DIABETES EDUCATOR REFERRAL       DIABETES SELF MANAGEMENT TRAINING (DSMT)      Your provider has referred you to Diabetes Education: UMP: Diabetes Education - [facility] [phone] [email or web address]  UMP: [facility] [place] Adult Diabetes Education - [facility] [place] Referrals ONLY - [phone]  [email or web address]    A  will call you to make your appointment. If it has been more than 3 business days since your referral was placed, please call the above phone number to schedule.    Type of training and number of hours: Previous Diagnosis: Follow-up DSMT - 2 hours.    Diabetes Type: Type 2 - On Insulin   Medicare covers: 10 hours of initial DSMT in 12 month period from the time of first visit, plus 2 hours of follow-up DSMT annually, and additional hours as requested for insulin training.         Diabetes Co-Morbidities: dyslipidemia and hypertension               A1C Goal:  <7.0       A1C is: Lab Results       Component                Value               Date                       A1C                      13.0                05/18/2018              MEDICAL NUTRITION THERAPY (MNT) for Diabetes    Medical Nutrition Therapy with a Registered Dietitian can be provided in coordination with Diabetes Self-Management Training to assist in achieving optimal diabetes management.    MNT Type and Hours: Previous diagnosis: Annual follow-up MNT - 2 hours                       Medicare will cover: 3 hours initial MNT in 12 month period after first visit, plus 2 hours of follow-up MNT annually        Diabetes Education Topics: Comprehensive Knowledge Assessment and Instruction, Knowledge: Monitoring Blood Sugar and Taking Medication and Medication Start: Insulin: Type/Dose/Timing: Basaglar, when to do meal time insulin    Special Educational Needs Requiring Individual DSMT: None      Please be aware that coverage of these services is subject to the terms and limitations of your health insurance plan.  Call member services at your health plan to determine Diabetes Self-Management Training (Codes  and ) and Medical Nutrition Therapy (Codes 81459 and 89488) benefits and ask which blood glucose monitor brands are covered by your  plan.  Please bring the following with you to your appointment:    (1)  List of current medications   (2)  List of Blood Glucose Monitor brands that are covered by your insurance plan  (3)  Blood Glucose Monitor and log book  (4)   Food records for the 3 days prior to your visit    The Certified Diabetes Educator may make diabetes medication adjustments per the CDE Protocol and Collaborative Practice Agreement.            NUTRITION REFERRAL       Your provider has referred you to: [facility]: [facility] [phone]   [email or web address]    Please be aware that coverage of these services is subject to the terms and limitations of your health insurance plan.  Call member services at your health plan with any benefit or coverage questions.      Please bring the following with you to your appointment:    (1) This referral request  (2) Any documents given to you regarding this referral  (3) Any specific questions you have about diet and/or food choices                  Your next 10 appointments already scheduled     Jun 25, 2018  1:50 PM CDT   LAB with LAB FIRST FLOOR [facility])    [address] [place] [id number] [phone]           Please do not eat 10-12 hours before your appointment if you are coming in fasting for labs on lipids, cholesterol, or glucose (sugar). This does not apply to pregnant women. Water, hot tea and black coffee (with nothing added) are okay. Do not drink other fluids, diet soda or chew gum.            Jun 25, 2018  2:10 PM CDT   Return Visit with Jennifer Thakur MD PhD   [facility])    [address] [address] [place] [id number] [phone]              Who to contact     If you have questions or need follow up information about today's clinic visit or your schedule please contact [facility] directly  at [phone].  Normal or non-critical lab and imaging results will be communicated to you by [employer], letter or phone within 4 business days after the clinic has received the results. If you do not hear from us within 7 days, please contact the clinic through [facility] or phone. If you have a critical or abnormal lab result, we will notify you by phone as soon as possible.  Submit refill requests through [employer] or call your pharmacy and they will forward the refill request to us. Please allow 3 business days for your refill to be completed.          Additional Information About Your Visit        [employer] Information     [employer] gives you secure access to your electronic health record. If you see a primary care provider, you can also send messages to your care team and make appointments. If you have questions, please call your primary care clinic.  If you do not have a primary care provider, please call [phone] and they will assist you.      [employer] is an electronic gateway that provides easy, online access to your medical records. With [employer], you can request a clinic appointment, read your test results, renew a prescription or communicate with your care team.     To access your existing account, please contact your [facility] or call [phone] for assistance.        Care EveryWhere ID     This is your Care EveryWhere ID. This could be used by other organizations to access your [facility] medical records  [id number]        Your Vitals Were     Pulse Temperature Last Period Pulse Oximetry BMI (Body Mass Index)       57 97.3  F (36.3  C) (Temporal) 12/21/2007 98% 27.49 kg/m2        Blood Pressure from Last 3 Encounters:   06/25/18 150/76   05/21/18 128/60   05/17/18 188/84    Weight from Last 3 Encounters:   06/25/18 169 lb (76.7 kg)   05/21/18 165 lb 14.4 oz (75.3 kg)   05/17/18 166 lb (75.3 kg)              We Performed the Following     DIABETES EDUCATOR REFERRAL      Hemoglobin A1c     NUTRITION REFERRAL          Today's Medication Changes          These changes are accurate as of 6/25/18  1:38 PM.  If you have any questions, ask your nurse or doctor.               Start taking these medicines.        Dose/Directions    canagliflozin 300 MG tablet   Commonly known as:  INVOKANA   Used for:  Type 2 diabetes mellitus with hyperglycemia, with long-term current use of insulin (H), Type 2 diabetes mellitus with microalbuminuria, without long-term current use of insulin (H)   Started by:  Jennifer Thakur MD PhD        Dose:  300 mg   Take 1 tablet (300 mg) by mouth every morning (before breakfast)   Quantity:  90 tablet   Refills:  0         These medicines have changed or have updated prescriptions.        Dose/Directions    * insulin glargine 100 UNIT/ML injection   Commonly known as:  LANTUS SOLOSTAR   This may have changed:  Another medication with the same name was changed. Make sure you understand how and when to take each.   Used for:  Type 2 diabetes mellitus with hyperglycemia, with long-term current use of insulin (H)   Changed by:  Jennifer Thakur MD PhD        Dose:  10 Units   Inject 10 Units Subcutaneous At Bedtime   Quantity:  3 mL   Refills:  0       * BASAGLAR 100 UNIT/ML injection   This may have changed:  how much to take   Used for:  Type 2 diabetes mellitus with microalbuminuria, without long-term current use of insulin (H), Type 2 diabetes mellitus with hyperglycemia, with long-term current use of insulin (H)   Changed by:  Jennifer Thakur MD PhD        Dose:  46 Units   Inject 46 Units Subcutaneous At Bedtime   Quantity:  45 mL   Refills:  1       * Notice:  This list has 2 medication(s) that are the same as other medications prescribed for you. Read the directions carefully, and ask your doctor or other care provider to review them with you.         Where to get your medicines      These medications were sent to [facility] - [place], [place] - [address] [facility] [address]  [address] [facility] [address], [place] [address]     Phone:  [phone]     atorvastatin 40 MG tablet    BASAGLAR 100 UNIT/ML injection    canagliflozin 300 MG tablet                Primary Care Provider Office Phone # Fax #    Jennifer Thakur MD PhD [phone]       [address]  [facility] [address]        Equal Access to Services     [facility] Hospital[facility]ISH : Hadii aad ku hadasho Soomaali, waaxda luqadaha, qaybta kaalmada adeegyada, waxay idiin hayaan adeeg kharash la'aan ah. So [facility] [phone].    ATENCIÓN: Si habla español, tiene a singh disposición servicios gratuitos de asistencia lingüística. Llame al [phone].    We comply with applicable federal civil rights laws and [place] laws. We do not discriminate on the basis of race, color, national origin, age, disability, sex, sexual orientation, or gender identity.            Thank you!     Thank you for choosing [facility]  for your care. Our goal is always to provide you with excellent care. Hearing back from our patients is one way we can continue to improve our services. Please take a few minutes to complete the written survey that you may receive in the mail after your visit with us. Thank you!             Your Updated Medication List - Protect others around you: Learn how to safely use, store and throw away your medicines at www.disposemymeds.org.          This list is accurate as of 6/25/18  1:38 PM.  Always use your most recent med list.                   Brand Name Dispense Instructions for use Diagnosis    amLODIPine 10 MG tablet    NORVASC    90 tablet    Take 1 tablet (10 mg) by mouth daily    Hypertension goal BP (blood pressure) < 130/80       aspirin 81 MG tablet      1 TABLET DAILY        atenolol 100 MG tablet    TENORMIN    90 tablet    Take 1 tablet (100 mg) by mouth daily    Essential hypertension       atorvastatin 40 MG tablet    LIPITOR    90 tablet    Take 1 tablet (40 mg) by mouth daily     Hyperlipidemia LDL goal <100       blood glucose lancets standard    no brand specified    300 each    Use to test blood sugar 3 times daily or as directed.    Type 2 diabetes mellitus with hyperglycemia, with long-term current use of insulin (H)       blood glucose monitoring lancets     300 each    Use to check blood sugar 3-4 times daily    Type 2 diabetes, HbA1c goal < 7% (H)       * blood glucose monitoring meter device kit     1 kit    Use to test blood sugars 3-4  times daily or as directed.    Hypertension goal BP (blood pressure) < 130/80, Type 2 diabetes, HbA1c goal < 7% (H), Hyperlipidemia LDL goal <100, Need for prophylactic vaccination and inoculation against influenza       * blood glucose monitoring meter device kit    no brand specified    1 kit    Use to test blood sugar 3 times daily or as directed.    Type 2 diabetes mellitus with hyperglycemia, with long-term current use of insulin (H)       * blood glucose monitoring test strip    ACCU-CHEK COMPACT DRUM    400 strip    Use to test blood sugars 3-4 times daily or as directed.    Type 2 diabetes, HbA1c goal < 7% (H)       * blood glucose monitoring test strip    no brand specified    300 strip    Use to test blood sugars 3 times daily or as directed    Type 2 diabetes mellitus with hyperglycemia, with long-term current use of insulin (H)       calcium-vitamin D 600-400 MG-UNIT per tablet    calcium 600 + D    90 tablet    Take 1 tablet by mouth daily    Type 2 diabetes mellitus without complication, without long-term current use of insulin (H)       canagliflozin 300 MG tablet    INVOKANA    90 tablet    Take 1 tablet (300 mg) by mouth every morning (before breakfast)    Type 2 diabetes mellitus with hyperglycemia, with long-term current use of insulin (H), Type 2 diabetes mellitus with microalbuminuria, without long-term current use of insulin (H)       glipiZIDE 10 MG tablet    GLUCOTROL    180 tablet    Take 1 tablet (10 mg) by mouth 2 times  daily (before meals)    Type 2 diabetes mellitus without complication, without long-term current use of insulin (H)       * insulin glargine 100 UNIT/ML injection    LANTUS SOLOSTAR    3 mL    Inject 10 Units Subcutaneous At Bedtime    Type 2 diabetes mellitus with hyperglycemia, with long-term current use of insulin (H)       * BASAGLAR 100 UNIT/ML injection     45 mL    Inject 46 Units Subcutaneous At Bedtime    Type 2 diabetes mellitus with microalbuminuria, without long-term current use of insulin (H), Type 2 diabetes mellitus with hyperglycemia, with long-term current use of insulin (H)       insulin pen needle 32G X 4 MM    BD BRITTON U/F    100 each    Use 1 daily or as directed.    Type 2 diabetes mellitus with hyperglycemia, with long-term current use of insulin (H)       losartan-hydrochlorothiazide 100-25 MG per tablet    HYZAAR    90 tablet    Take 1 tablet by mouth daily    Benign essential hypertension       MULTIVITAMIN TABS   OR      1 TABLET DAILY        * Notice:  This list has 6 medication(s) that are the same as other medications prescribed for you. Read the directions carefully, and ask your doctor or other care provider to review them with you.

## 2018-07-25 ENCOUNTER — OFFICE VISIT (OUTPATIENT)
Dept: NURSING | Facility: CLINIC | Age: 68
End: 2018-07-25
Attending: INTERNAL MEDICINE
Payer: COMMERCIAL

## 2018-07-25 DIAGNOSIS — E11.9 DIABETES MELLITUS, TYPE 2 (H): Primary | ICD-10-CM

## 2018-07-25 DIAGNOSIS — E11.22 TYPE 2 DIABETES MELLITUS WITH DIABETIC CHRONIC KIDNEY DISEASE (H): Primary | ICD-10-CM

## 2018-07-25 PROCEDURE — G0108 DIAB MANAGE TRN  PER INDIV: HCPCS

## 2018-07-25 RX ORDER — FLASH GLUCOSE SENSOR
KIT MISCELLANEOUS
Qty: 3 EACH | Refills: 11 | Status: SHIPPED | OUTPATIENT
Start: 2018-07-25 | End: 2018-09-10

## 2018-07-25 RX ORDER — FLASH GLUCOSE SENSOR
1 KIT MISCELLANEOUS CONTINUOUS
Qty: 1 DEVICE | Refills: 0 | Status: SHIPPED | OUTPATIENT
Start: 2018-07-25 | End: 2018-09-10

## 2018-07-25 NOTE — PATIENT INSTRUCTIONS
1. To change Basaglar dose from PM to AM:  -- Tonight (Wed night): take 20 units Basaglar   -- Tomorrow (Thurs) morning: take 40 units Basaglar  -- Friday morning take 46 units Basaglar    2. Call Cde (Kacie Kearney) for an appointment, if you decide to pursue getting the [employer] glucose system, to help with initial start.    Kacie Kearney, RN, BSN, CDE   [facility]

## 2018-07-25 NOTE — MR AVS SNAPSHOT
After Visit Summary   7/25/2018    Isabella Mcginnis    MRN: [id number]           Patient Information     Date Of Birth          1950        Visit Information        Provider Department      7/25/2018 1:00 PM Provider, [facility]        Today's Diagnoses     Type 2 diabetes mellitus with diabetic chronic kidney disease (H)    -  1      Care Instructions    1. To change Basaglar dose from PM to AM:  -- Tonight (Wed night): take 20 units Basaglar   -- Tomorrow (Thurs) morning: take 40 units Basaglar  -- Friday morning take 46 units Basaglar    2. Call Cde (Kacie Kearney) for an appointment, if you decide to pursue getting the [employer] glucose system, to help with initial start.    Kacie Kearney RN, BSN, CDE   [facility]          Follow-ups after your visit        Your next 10 appointments already scheduled     Sep 10, 2018 10:40 AM CDT   LAB with LAB FIRST FLOOR [facility])    [address] [place] [id number]   [phone]           Please do not eat 10-12 hours before your appointment if you are coming in fasting for labs on lipids, cholesterol, or glucose (sugar). This does not apply to pregnant women. Water, hot tea and black coffee (with nothing added) are okay. Do not drink other fluids, diet soda or chew gum.            Sep 10, 2018 11:10 AM CDT   Return Visit with Jennifer Thakru MD PhD   [facility])    [address] [place] [id number]   [phone]            Dec 07, 2018  2:15 PM CST   New Visit with Claudette Marino MD   [facility])    [id number] [place] [id number]   [phone]              Who to contact     If you have questions or need follow up information about today's clinic visit or your schedule please contact [facility]  directly at [phone].  Normal or non-critical lab and imaging results will be communicated to you by [employer], letter or phone within 4 business days after the clinic has received the results. If you do not hear from us within 7 days, please contact the clinic through [employer] or phone. If you have a critical or abnormal lab result, we will notify you by phone as soon as possible.  Submit refill requests through "[employer] or call your pharmacy and they will forward the refill request to us. Please allow 3 business days for your refill to be completed.          Additional Information About Your Visit        "[employer] Information     "[employer] gives you secure access to your electronic health record. If you see a primary care provider, you can also send messages to your care team and make appointments. If you have questions, please call your primary care clinic.  If you do not have a primary care provider, please call [phone] and they will assist you.      "[employer] is an electronic gateway that provides easy, online access to your medical records. With "[employer] you can request a clinic appointment, read your test results, renew a prescription or communicate with your care team.     To access your existing account, please contact your [facility] or call [phone] for assistance.        Care EveryWhere ID     This is your Care EveryWhere ID. This could be used by other organizations to access your [facility] medical records  [id number]        Your Vitals Were     Last Period                   12/21/2007            Blood Pressure from Last 3 Encounters:   06/25/18 150/76   05/21/18 128/60   05/17/18 188/84    Weight from Last 3 Encounters:   06/25/18 76.7 kg (169 lb)   05/21/18 75.3 kg (165 lb 14.4 oz)   05/17/18 75.3 kg (166 lb)              We Performed the Following     DIABETES EDUCATION - Individual  []          Today's Medication Changes          These changes are accurate as  of 7/25/18 11:59 PM.  If you have any questions, ask your nurse or doctor.               Start taking these medicines.        Dose/Directions    continuous blood glucose monitoring sensor   Used for:  Diabetes mellitus, type 2 (H)   Started by:  Kacie Kearney        For use with Freestyle Derrick Flash  for continuous monitioring of blood glucose levels. Replace sensor every 10 days.   Quantity:  3 each   Refills:  11       FREESTYLE DERRICK READER Elizabeth   Used for:  Diabetes mellitus, type 2 (H)   Started by:  Kacie Kearney        Dose:  1 each   1 each continuous   Quantity:  1 Device   Refills:  0            Where to get your medicines      Some of these will need a paper prescription and others can be bought over the counter.  Ask your nurse if you have questions.     Bring a paper prescription for each of these medications     continuous blood glucose monitoring sensor    FREESTYLE DERRICK READER Elizabeth                Primary Care Provider Office Phone # Fax #    Jennifer Thakur MD PhD [phone]       [address]  [facility] [address]        Equal Access to Services     [place] [facility] CenterANISH : Hadii aad ku hadasho Soomaali, waaxda luqadaha, qaybta kaalmada adeegyada, waxay idiin haysullyn chino khchidi stock . So [facility] [phone].    ATENCIÓN: Si habla español, tiene a singh disposición servicios gratuitos de asistencia lingüística. Llame al [phone].    We comply with applicable federal civil rights laws and [place] laws. We do not discriminate on the basis of race, color, national origin, age, disability, sex, sexual orientation, or gender identity.            Thank you!     Thank you for choosing [facility]  for your care. Our goal is always to provide you with excellent care. Hearing back from our patients is one way we can continue to improve our services. Please take a few minutes to complete the written survey that you may receive in the mail after your visit with us. Thank you!              Your Updated Medication List - Protect others around you: Learn how to safely use, store and throw away your medicines at www.disposemymeds.org.          This list is accurate as of 7/25/18 11:59 PM.  Always use your most recent med list.                   Brand Name Dispense Instructions for use Diagnosis    amLODIPine 10 MG tablet    NORVASC    90 tablet    Take 1 tablet (10 mg) by mouth daily    Hypertension goal BP (blood pressure) < 130/80       aspirin 81 MG tablet      1 TABLET DAILY        atenolol 100 MG tablet    TENORMIN    90 tablet    Take 1 tablet (100 mg) by mouth daily    Essential hypertension       atorvastatin 40 MG tablet    LIPITOR    90 tablet    Take 1 tablet (40 mg) by mouth daily    Hyperlipidemia LDL goal <100       blood glucose lancets standard    no brand specified    300 each    Use to test blood sugar 3 times daily or as directed.    Type 2 diabetes mellitus with hyperglycemia, with long-term current use of insulin (H)       blood glucose monitoring lancets     300 each    Use to check blood sugar 3-4 times daily    Type 2 diabetes, HbA1c goal < 7% (H)       * blood glucose monitoring meter device kit     1 kit    Use to test blood sugars 3-4  times daily or as directed.    Hypertension goal BP (blood pressure) < 130/80, Type 2 diabetes, HbA1c goal < 7% (H), Hyperlipidemia LDL goal <100, Need for prophylactic vaccination and inoculation against influenza       * blood glucose monitoring meter device kit    no brand specified    1 kit    Use to test blood sugar 3 times daily or as directed.    Type 2 diabetes mellitus with hyperglycemia, with long-term current use of insulin (H)       * blood glucose monitoring test strip    ACCU-CHEK COMPACT DRUM    400 strip    Use to test blood sugars 3-4 times daily or as directed.    Type 2 diabetes, HbA1c goal < 7% (H)       * blood glucose monitoring test strip    no brand specified    300 strip    Use to test blood sugars 3 times daily  or as directed    Type 2 diabetes mellitus with hyperglycemia, with long-term current use of insulin (H)       calcium-vitamin D 600-400 MG-UNIT per tablet    calcium 600 + D    90 tablet    Take 1 tablet by mouth daily    Type 2 diabetes mellitus without complication, without long-term current use of insulin (H)       canagliflozin 300 MG tablet    INVOKANA    90 tablet    Take 1 tablet (300 mg) by mouth every morning (before breakfast)    Type 2 diabetes mellitus with hyperglycemia, with long-term current use of insulin (H), Type 2 diabetes mellitus with microalbuminuria, without long-term current use of insulin (H)       continuous blood glucose monitoring sensor     3 each    For use with Freestyle Derrick Flash  for continuous monitioring of blood glucose levels. Replace sensor every 10 days.    Diabetes mellitus, type 2 (H)       FREESTYLE DERRICK READER Elizabeth     1 Device    1 each continuous    Diabetes mellitus, type 2 (H)       glipiZIDE 10 MG tablet    GLUCOTROL    180 tablet    Take 1 tablet (10 mg) by mouth 2 times daily (before meals)    Type 2 diabetes mellitus without complication, without long-term current use of insulin (H)       * insulin glargine 100 UNIT/ML injection    LANTUS SOLOSTAR    3 mL    Inject 10 Units Subcutaneous At Bedtime    Type 2 diabetes mellitus with hyperglycemia, with long-term current use of insulin (H)       * BASAGLAR 100 UNIT/ML injection     45 mL    Inject 46 Units Subcutaneous At Bedtime    Type 2 diabetes mellitus with microalbuminuria, without long-term current use of insulin (H), Type 2 diabetes mellitus with hyperglycemia, with long-term current use of insulin (H)       insulin pen needle 32G X 4 MM    BD BRITTON U/F    100 each    Use 1 daily or as directed.    Type 2 diabetes mellitus with hyperglycemia, with long-term current use of insulin (H)       losartan-hydrochlorothiazide 100-25 MG per tablet    HYZAAR    90 tablet    Take 1 tablet by mouth daily     Benign essential hypertension       MULTIVITAMIN TABS   OR      1 TABLET DAILY        * Notice:  This list has 6 medication(s) that are the same as other medications prescribed for you. Read the directions carefully, and ask your doctor or other care provider to review them with you.

## 2018-08-09 NOTE — PROGRESS NOTES
Diabetes Self Management Training: Individual Review Visit    Isabella Mcginnis presents today for education and evaluation of glucose control related to Type 2 diabetes.    She is accompanied by self    Patient's diabetes management related comments/concerns: none    Patient's emotional response to diabetes: expresses readiness to learn and bargaining    Patient would like this visit to be focused around the following diabetes-related behaviors and goals: Taking Medication. changes    ASSESSMENT:  Patient referred by Pcp, Dr Thakur, for review of diabetes education and self-mgmt. Pt's A1c has climbed from 7.0 (09/15 to 13.0 (05/18) Patient last seen by Endo in 2016. Did not keep follow up appt with Endo.     Patient states her spouse had Type 1 Diabetes and passed away 1 year ago. Patient planning on moving to [place] so she can be closer to her son and his family.     Patient states in 2010, she lost 100 lbs, doing Weight Watchers. She has not gained back the weight.     Current Diabetes Management per Patient:  Taking diabetes medications? Yes: Current medication doses: Basaglar: 46 units daily (started 4 weeks ago), Invokana, 300mg daily (will stop in 2 mos due to cost - does not plan to refill). Currently not interested in exploring other options for this med. Glipizide: 10 mg bid.        Diabetes Medication(s)     Insulin Sig    BASAGLAR 100 UNIT/ML injection Inject 46 Units Subcutaneous At Bedtime    insulin glargine (LANTUS SOLOSTAR) 100 UNIT/ML pen Inject 10 Units Subcutaneous At Bedtime     Patient not taking:  Reported on 6/25/2018    Sodium-Glucose Co-Transporter 2 (SGLT2) Inhibitors Sig    canagliflozin (INVOKANA) 300 MG tablet Take 1 tablet (300 mg) by mouth every morning (before breakfast)    Sulfonylureas Sig    glipiZIDE (GLUCOTROL) 10 MG tablet Take 1 tablet (10 mg) by mouth 2 times daily (before meals)        Do you have any difficulty affording your medications or glucose monitoring supplies?  " Yes. See above.     Patient glucose self monitoring as follows: rarely nut improving.   BG meter: Accu-Chek Anisha  BG results: all fastin/10: 145  : 211  : 192  : 107  : 125  : 158   Per Meter: 90 day ave: 262, 30 day average: 191, 14 & 7 day ave: 142.     BG values are: Not in goal, but improving.   Patient's most recent   Lab Results   Component Value Date    A1C 13.0 2018    is not meeting goal of <7.0    Nutrition:  Patient eats 3 meals per day and snacks frequently throughout the day    Breakfast - 2 waffles with sugar-free syrup or bagel or toast or cereal.   Lunch - salad or eggs or fruit   Dinner - stir joseph with chkn and vegetables. Does not eat much red meat.  Snacks - granola bars, Kind bars, nuts, sugar-free popsicles.     Beverages: water and diet soda.     Cultural/Hindu diet restrictions: No     Biggest Challenge to Healthy Eating: none.     Physical Activity:    Walks 3 days a week, works in garden.    Diabetes Risk Factors:  age over 45 years    Relevant co-morbidities and related health problems:  Significant for:  none    Diabetes Complications:  Not discussed today.    Recent health service and resource utilization related to diabetes (hyperglycemia, hypoglycemia, etc):   None    Vitals:  [facility] 2007  Estimated body mass index is 27.49 kg/(m^2) as calculated from the following:    Height as of 18: 1.67 m (5' 5.75\").    Weight as of 18: 76.7 kg (169 lb).   Last 3 BP:   BP Readings from Last 3 Encounters:   18 150/76   18 128/60   18 188/84       History   Smoking Status     Never Smoker   Smokeless Tobacco     Never Used     Labs:  Lab Results   Component Value Date    A1C 13.0 2018     Lab Results   Component Value Date     2018     Lab Results   Component Value Date    LDL 55 2018     HDL Cholesterol   Date Value Ref Range Status   2018 47 (L) >49 mg/dL Final   ]  GFR Estimate   Date Value Ref " "Range Status   05/18/2018 67 >60 mL/min/1.7m2 Final     Comment:     Non  GFR Calc     GFR Estimate If Black   Date Value Ref Range Status   05/18/2018 81 >60 mL/min/1.7m2 Final     Comment:      GFR Calc     Lab Results   Component Value Date    CR 0.85 05/18/2018     No results found for: MICROALBUMIN    Socio/Economic/Cultural Considerations:    Support system: not assessed and no-one at this time but will be moving to [place] to live near son.     Cultural Influences/Ethnic Background:  American    Health Literacy/Numeracy:  \"With diabetes, it's helpful to use forms and log books to write down blood sugars and what you're eating at times to help understand how foods affect your blood sugars. With this in mind how confident are you at filling out medical forms, such as these, by yourself?  Not Assessed    Health Beliefs and Attitudes:   Patient Activation Measure Survey Score:  GAY Score (Last Two) 5/5/2011   GAY Raw Score 37   Activation Score 49.9   GAY Level 2     Stage of Change: CONTEMPLATION (Considering change and yet undecided)    Diabetes knowledge and skills assessment:     Patient is knowledgeable in diabetes management concepts related to: Healthy Eating, Being Active, Monitoring, Taking Medication and Reducing Risks    Patient needs further education on the following diabetes management concepts: Problem Solving and Healthy Coping    Barriers to Learning Assessment: No Barriers identified    Based on learning assessment above, most appropriate setting for further diabetes education would be: Individual setting.    INTERVENTION:   Education provided today on:  Taking Medication: Pt wishes to move Basaglar dosing from PM to AM in hopes this mart help her not to forget to take med. Pt states she takes other meds in the morning.    Discussed use of LibreFlash cgms since ptt does not like to check bg. Pt seemed interested.      Opportunities for ongoing education and " support in diabetes-self management were discussed.    Pt verbalized understanding of concepts discussed and recommendations provided today.       Education Materials Provided:  No new materials provided today    PLAN:  Taking Medication: Change Basaglar from PM to AM dosing. Pt given instrutions on how to do this.   Call Cde for an appt if pt chooses to move forward wiht ordering Derrick Flash cgms.     FOLLOW-UP:  Appt made with Dr Marino in Dec.    Ongoing plan for education and support: as needed.with Cde.    Time Spent: 75 minutes  Encounter Type: Bairon Mcginnis comes into clinic today at the request of Dr Thakur, Ordering Provider for Pt Teaching on diabetes self mgmt.     This service provided today was under the supervising provider of the day Dr Gallardo,  who was available if needed.    Kacie Kearney, RN, BSN, CDE   [facility]

## 2018-09-04 ENCOUNTER — TELEPHONE (OUTPATIENT)
Dept: PHARMACY | Facility: CLINIC | Age: 68
End: 2018-09-04

## 2018-09-04 NOTE — TELEPHONE ENCOUNTER
Called patient for an initial MTM visit referral from  at 11am. No answer. Left message for patient to return call. Called patient again at 11:20. No answer. Left message for patient to contact our scheduling line to reschedule appt.    Inés Flores, Pharm.D, BCACP  Medication Therapy Management Pharmacist

## 2018-09-10 ENCOUNTER — OFFICE VISIT (OUTPATIENT)
Dept: PEDIATRICS | Facility: CLINIC | Age: 68
End: 2018-09-10
Payer: COMMERCIAL

## 2018-09-10 ENCOUNTER — OFFICE VISIT (OUTPATIENT)
Dept: PHARMACY | Facility: CLINIC | Age: 68
End: 2018-09-10
Payer: COMMERCIAL

## 2018-09-10 ENCOUNTER — TELEPHONE (OUTPATIENT)
Dept: PEDIATRICS | Facility: CLINIC | Age: 68
End: 2018-09-10

## 2018-09-10 VITALS
SYSTOLIC BLOOD PRESSURE: 134 MMHG | DIASTOLIC BLOOD PRESSURE: 61 MMHG | WEIGHT: 179 LBS | TEMPERATURE: 97.7 F | HEART RATE: 49 BPM | BODY MASS INDEX: 29.11 KG/M2 | OXYGEN SATURATION: 97 %

## 2018-09-10 DIAGNOSIS — M85.80 OSTEOPENIA, UNSPECIFIED LOCATION: ICD-10-CM

## 2018-09-10 DIAGNOSIS — I10 HYPERTENSION GOAL BP (BLOOD PRESSURE) < 130/80: ICD-10-CM

## 2018-09-10 DIAGNOSIS — E11.65 TYPE 2 DIABETES MELLITUS WITH HYPERGLYCEMIA, WITH LONG-TERM CURRENT USE OF INSULIN (H): ICD-10-CM

## 2018-09-10 DIAGNOSIS — Z79.4 TYPE 2 DIABETES MELLITUS WITH HYPERGLYCEMIA, WITH LONG-TERM CURRENT USE OF INSULIN (H): ICD-10-CM

## 2018-09-10 DIAGNOSIS — E63.9 NUTRITIONAL DEFICIENCY: ICD-10-CM

## 2018-09-10 DIAGNOSIS — R00.1 BRADYCARDIA: ICD-10-CM

## 2018-09-10 DIAGNOSIS — R80.9 TYPE 2 DIABETES MELLITUS WITH MICROALBUMINURIA, WITHOUT LONG-TERM CURRENT USE OF INSULIN (H): Primary | ICD-10-CM

## 2018-09-10 DIAGNOSIS — E11.29 TYPE 2 DIABETES MELLITUS WITH MICROALBUMINURIA, WITHOUT LONG-TERM CURRENT USE OF INSULIN (H): ICD-10-CM

## 2018-09-10 DIAGNOSIS — E78.5 HYPERLIPIDEMIA LDL GOAL <100: ICD-10-CM

## 2018-09-10 DIAGNOSIS — Z23 FLU VACCINE NEED: ICD-10-CM

## 2018-09-10 DIAGNOSIS — G47.00 PERSISTENT DISORDER OF INITIATING OR MAINTAINING SLEEP: ICD-10-CM

## 2018-09-10 DIAGNOSIS — E11.9 TYPE 2 DIABETES, HBA1C GOAL < 7% (H): Primary | ICD-10-CM

## 2018-09-10 DIAGNOSIS — R80.9 TYPE 2 DIABETES MELLITUS WITH MICROALBUMINURIA, WITHOUT LONG-TERM CURRENT USE OF INSULIN (H): ICD-10-CM

## 2018-09-10 DIAGNOSIS — E11.29 TYPE 2 DIABETES MELLITUS WITH MICROALBUMINURIA, WITHOUT LONG-TERM CURRENT USE OF INSULIN (H): Primary | ICD-10-CM

## 2018-09-10 LAB — HBA1C MFR BLD: 10.2 % (ref 0–5.6)

## 2018-09-10 PROCEDURE — 83036 HEMOGLOBIN GLYCOSYLATED A1C: CPT | Performed by: INTERNAL MEDICINE

## 2018-09-10 PROCEDURE — 99207 C FOOT EXAM  NO CHARGE: CPT | Performed by: INTERNAL MEDICINE

## 2018-09-10 PROCEDURE — 99605 MTMS BY PHARM NP 15 MIN: CPT | Performed by: PHARMACIST

## 2018-09-10 PROCEDURE — 99214 OFFICE O/P EST MOD 30 MIN: CPT | Mod: 25 | Performed by: INTERNAL MEDICINE

## 2018-09-10 PROCEDURE — 36416 COLLJ CAPILLARY BLOOD SPEC: CPT | Performed by: INTERNAL MEDICINE

## 2018-09-10 PROCEDURE — 90662 IIV NO PRSV INCREASED AG IM: CPT | Performed by: INTERNAL MEDICINE

## 2018-09-10 PROCEDURE — G0008 ADMIN INFLUENZA VIRUS VAC: HCPCS | Performed by: INTERNAL MEDICINE

## 2018-09-10 PROCEDURE — 99607 MTMS BY PHARM ADDL 15 MIN: CPT | Performed by: PHARMACIST

## 2018-09-10 RX ORDER — INSULIN GLARGINE 100 [IU]/ML
65 INJECTION, SOLUTION SUBCUTANEOUS AT BEDTIME
Qty: 60 ML | Refills: 1 | Status: SHIPPED | OUTPATIENT
Start: 2018-09-10 | End: 2018-09-10

## 2018-09-10 RX ORDER — REPAGLINIDE 1 MG/1
1 TABLET ORAL
Qty: 270 TABLET | Refills: 0 | Status: SHIPPED | OUTPATIENT
Start: 2018-09-10

## 2018-09-10 RX ORDER — INSULIN GLARGINE 100 [IU]/ML
46 INJECTION, SOLUTION SUBCUTANEOUS AT BEDTIME
Qty: 45 ML | Refills: 1 | Status: SHIPPED | OUTPATIENT
Start: 2018-09-10 | End: 2018-10-10

## 2018-09-10 NOTE — PATIENT INSTRUCTIONS
Make appointment(s) for:   -- diabetes follow up in November with non fasting lab.  See appointment details below          Orders Placed This Encounter   Medications     BASAGLAR 100 UNIT/ML injection     Sig: Inject 65 Units Subcutaneous At Bedtime     Dispense:  60 mL     Refill:  1     repaglinide (PRANDIN) 1 MG tablet     Sig: Take 1 tablet (1 mg) by mouth 3 times daily (before meals)     Dispense:  270 tablet     Refill:  0

## 2018-09-10 NOTE — LETTER
"     [facility]     Date: 2018    Isabella Mcginnis  [address] [place] [address]    Dear Ms. Mcginnis,    Thank you for talking with me on September 10, 2018 about your health and medications. Medicare s MTM (Medication Therapy Management) program helps you understand your medications and use them safely.      This letter includes an action plan (Medication Action Plan) and medication list (Personal Medication List). The action plan has steps you should take to help you get the best results from your medications. The medication list will help you keep track of your medications and how to use them the right way.       Have your action plan and medication list with you when you talk with your doctors, pharmacists, and other healthcare providers in your care team.     Ask your doctors, pharmacists, and other healthcare providers to update the action plan and medication list at every visit.     Take your medication list with you if you go to the hospital or emergency room.     Give a copy of the action plan and medication list to your family or caregivers.     If you want to talk about this letter or any of the papers with it, please call   [phone].   We look forward to working with you, your doctors, and other healthcare providers to help you stay healthy.     Sincerely,    Inés Flores      Enclosed: Medication Action Plan and Personal Medication List    MEDICATION ACTION PLAN FOR Isabella Mcginnis,  1950     This action plan will help you get the best results from your medications if you:   1. Read \"What we talked about.\"   2. Take the steps listed in the \"What I need to do\" boxes.   3. Fill in \"What I did and when I did it.\"   4. Fill in \"My follow-up plan\" and \"Questions I want to ask.\"     Have this action plan with you when you talk with your doctors, pharmacists, and other healthcare providers in your care team. Share this with your family or " caregivers too.  DATE PREPARED: 2018  What we talked about: blood pressure control                                                  What I need to do: Take atenolol 50mg in the morning and 50mg in the evening.       What I did and when I did it:                                              What we talked about: monitoring your blood pressure                                                  What I need to do: monitor your blood pressure daily       What I did and when I did it:                                               What we talked about: blood sugar control                                                  What I need to do: check your blood sugars 2 hours after breakfast 2-3 times a week       What I did and when I did it:                                                   My follow-up plan:                 Questions I want to ask:              If you have any questions about your action plan, call Inés Ruddy Flores, Phone: [phone] , Monday-Friday 8-4:30pm.           MEDICATION LIST FOR Isabella Mcginnis,  1950     This medication list was made for you after we talked. We also used information from your doctor's chart.      Use blank rows to add new medications. Then fill in the dates you started using them.    Cross out medications when you no longer use them. Then write the date and why you stopped using them.    Ask your doctors, pharmacists, and other healthcare providers to update this list at every visit. Keep this list up-to-date with:       Prescription medications    Over the counter drugs     Herbals    Vitamins    Minerals      If you go to the hospital or emergency room, take this list with you. Share this with your family or caregivers too.     DATE PREPARED: 2018  Allergies or side effects: Actos [pioglitazone]; Victoza; Prinivil [lisinopril]; and Metformin     Medication:  ACCU-CHEK COMPACT PLUS CARE KIT      How I use it:  Use to test blood sugars 3-4  times daily or  as directed.      Why I use it: Hypertension goal BP (blood pressure) < 130/80; Type 2 diabetes, HbA1c goal < 7% (H); Hyperlipidemia LDL goal <100; Need for prophylactic vaccination and inoculation against influenza    Prescriber:  Asif Yates MD      Date I started using it:       Date I stopped using it:         Why I stopped using it:            Medication:  AMLODIPINE BESYLATE 10 MG PO TABS      How I use it:  Take 1 tablet (10 mg) by mouth daily      Why I use it: Hypertension goal BP (blood pressure) < 130/80    Prescriber:  Jennifer Thakur MD PhD      Date I started using it:       Date I stopped using it:         Why I stopped using it:            Medication:  ASPIRIN 81 MG OR TABS      How I use it:  1 TABLET DAILY      Why I use it: Heart Health      Prescriber:  Patient Reported      Date I started using it:       Date I stopped using it:         Why I stopped using it:            Medication:  ATENOLOL 100 MG PO TABS      How I use it:  Take 1 tablet (100 mg) by mouth daily      Why I use it: Essential hypertension    Prescriber:  Jennifer Thakur MD PhD      Date I started using it:       Date I stopped using it:         Why I stopped using it:            Medication:  ATORVASTATIN CALCIUM 40 MG PO TABS      How I use it:  Take 1 tablet (40 mg) by mouth daily      Why I use it: Hyperlipidemia LDL goal <100    Prescriber:  Jennifer Thakur MD PhD      Date I started using it:       Date I stopped using it:         Why I stopped using it:            Medication:  BASAGLAR KWIKPEN 100 UNIT/ML SC SOPN      How I use it:  Inject 46 Units Subcutaneous At Bedtime      Why I use it: Type 2 diabetes mellitus with microalbuminuria, without long-term current use of insulin (H)    Prescriber:  Jennifer Thakur MD PhD      Date I started using it:       Date I stopped using it:         Why I stopped using it:            Medication:  CALCIUM CARBONATE-VITAMIN D 600-400 MG-UNIT PO TABS      How I use it:  Take 1 tablet by mouth daily       Why I use it: Bone Health    Prescriber:  Claudette Marino MD      Date I started using it:       Date I stopped using it:         Why I stopped using it:            Medication:  CANAGLIFLOZIN 300 MG PO TABS      How I use it:  Take 1 tablet (300 mg) by mouth every morning (before breakfast)      Why I use it: Type 2 diabetes mellitus with hyperglycemia, with long-term current use of insulin (H); Type 2 diabetes mellitus with microalbuminuria, without long-term current use of insulin (H)    Prescriber:  Jennifer Thakur MD PhD      Date I started using it:       Date I stopped using it:         Why I stopped using it:            Medication:  GLIPIZIDE 10 MG PO TABS      How I use it:  Take 1 tablet (10 mg) by mouth 2 times daily (before meals)      Why I use it: Type 2 diabetes mellitus without complication, without long-term current use of insulin (H)    Prescriber:  Claudette Marino MD      Date I started using it:       Date I stopped using it:         Why I stopped using it:            Medication:  INSULIN PEN NEEDLE 32G X 4 MM MISC      How I use it:  Use 1 daily or as directed.      Why I use it: Type 2 diabetes mellitus with hyperglycemia, with long-term current use of insulin (H)    Prescriber:  Jennifer Thakur MD PhD      Date I started using it:       Date I stopped using it:         Why I stopped using it:            Medication:  LANCETS STANDARD MISC      How I use it:  Use to test blood sugar 3 times daily or as directed.      Why I use it: Type 2 diabetes mellitus with hyperglycemia, with long-term current use of insulin (H)    Prescriber:  Jennifer Thakur MD PhD      Date I started using it:       Date I stopped using it:         Why I stopped using it:            Medication:  LOSARTAN POTASSIUM-HCTZ 100-25 MG PO TABS      How I use it:  Take 1 tablet by mouth daily      Why I use it: Benign essential hypertension    Prescriber:  Claudette Marino MD      Date I started using it:       Date I stopped using it:         Why I  stopped using it:            Medication:  MULTIVITAMIN TABS   OR      How I use it:  1 TABLET DAILY      Why I use it: General Health      Prescriber:  Patient Reported      Date I started using it:       Date I stopped using it:         Why I stopped using it:            Medication:  REPAGLINIDE 1 MG PO TABS      How I use it:  Take 1 tablet (1 mg) by mouth 3 times daily (before meals)      Why I use it: Diabetes    Prescriber:  Jennifer Thakur MD PhD      Date I started using it:       Date I stopped using it:         Why I stopped using it:            Medication:         How I use it:         Why I use it:      Prescriber:         Date I started using it:       Date I stopped using it:         Why I stopped using it:            Medication:         How I use it:         Why I use it:      Prescriber:         Date I started using it:       Date I stopped using it:         Why I stopped using it:            Medication:         How I use it:         Why I use it:      Prescriber:         Date I started using it:       Date I stopped using it:         Why I stopped using it:              Other Information:     If you have any questions about your action plan, call [phone].    According to the Paperwork Reduction Act of 1995, no persons are required to respond to a collection of information unless it displays a valid OMB control number. The valid OMB number for this information collection is 9137-9878. The time required to complete this information collection is estimated to average 40 minutes per response, including the time to review instructions, searching existing data resources, gather the data needed, and complete and review the information collection. If you have any comments concerning the accuracy of the time estimate(s) or suggestions for improving this form, please write to: CMS, Attn: KELLY Reports Clearance Officer, [address] [place] [phone].

## 2018-09-10 NOTE — PATIENT INSTRUCTIONS
Recommendations from today's MTM visit:                                                    MTM (medication therapy management) is a service provided by a clinical pharmacist designed to help you get the most of out of your medicines.   Today we reviewed what your medicines are for, how to know if they are working, that your medicines are safe and how to make your medicine regimen as easy as possible.     1. Split atenolol dose and take 50mg in the morning and 50mg in the evening.     2. Check blood pressure daily and send readings in 2 weeks via [facility]    3. Check blood sugars 2-3 times a week 2 hours after breakfast and send readings in 2 weeks    4. Aim for 1200mg of calcium a day.    5. NPH and R insulin (Relion) through [facility] or through [facility]. Also 70/30 insulin would be available at $25 dollar    Next MTM visit: 2 weeks via [facility]    To schedule another MTM appointment, please call the clinic directly or you may call the MTM scheduling line at [phone] or toll-free at [phone].     My Clinical Pharmacist's contact information:                                                      It was a pleasure seeing you today!  Please feel free to contact me with any questions or concerns you have.      Inés Flores, Pharm.D, [facility]  Medication Therapy Management Pharmacist  [phone]    You may receive a survey about the MTM services you received.  I would appreciate your feedback to help me serve you better in the future. Please fill it out and return it when you can. Your comments will be anonymous.

## 2018-09-10 NOTE — MR AVS SNAPSHOT
After Visit Summary   9/10/2018    Isabella Mcginnis    MRN: [id number]           Patient Information     Date Of Birth          1950        Visit Information        Provider Department      9/10/2018 11:10 AM Jennifer Thakur MD PhD [facility]        Today's Diagnoses     Type 2 diabetes mellitus with microalbuminuria, without long-term current use of insulin (H)    -  1    Flu vaccine need        Hypertension goal BP (blood pressure) < 130/80          Care Instructions    Make appointment(s) for:   -- diabetes follow up in November with non fasting lab.  See appointment details below          Orders Placed This Encounter   Medications     BASAGLAR 100 UNIT/ML injection     Sig: Inject 65 Units Subcutaneous At Bedtime     Dispense:  60 mL     Refill:  1     repaglinide (PRANDIN) 1 MG tablet     Sig: Take 1 tablet (1 mg) by mouth 3 times daily (before meals)     Dispense:  270 tablet     Refill:  0                 Follow-ups after your visit        Follow-up notes from your care team     Return in about 2 months (around 11/10/2018) for Fasting lab work, Diabetic check.      Your next 10 appointments already scheduled     Sep 10, 2018  1:00 PM CDT   Office Visit with Inés Flores [facility] ([facility])    [address] [place] [address] [address]  [address] [facility]  [facility] [address] [phone]           Bring a current list of meds and any records pertaining to this visit. For Physicals, please bring immunization records and any forms needing to be filled out. Please arrive 10 minutes early to complete paperwork.            Nov 20, 2018 11:30 AM CST   LAB with LAB FIRST FLOOR Replaced by [facility] ([facility])    [address] [place] [address] [address]  [facility] [address] [phone]           Please do not eat 10-12 hours before your appointment if you are coming in fasting for labs on lipids, cholesterol, or glucose  (sugar). This does not apply to pregnant women. Water, hot tea and black coffee (with nothing added) are okay. Do not drink other fluids, diet soda or chew gum.            Nov 20, 2018 11:50 AM CST   Return Visit with Jennifer Thakur MD PhD   [facility] ([facility]    [address] [address] [place] [address] [phone]              Future tests that were ordered for you today     Open Future Orders        Priority Expected Expires Ordered    **A1C FUTURE anytime Routine 9/10/2018 9/10/2019 9/10/2018            Who to contact     If you have questions or need follow up information about today's clinic visit or your schedule please contact [facility] directly at [phone].  Normal or non-critical lab and imaging results will be communicated to you by [facility], letter or phone within 4 business days after the clinic has received the results. If you do not hear from us within 7 days, please contact the clinic through [facility] or phone. If you have a critical or abnormal lab result, we will notify you by phone as soon as possible.  Submit refill requests through [facility] or call your pharmacy and they will forward the refill request to us. Please allow 3 business days for your refill to be completed.          Additional Information About Your Visit        [facility] Information     [facility] gives you secure access to your electronic health record. If you see a primary care provider, you can also send messages to your care team and make appointments. If you have questions, please call your primary care clinic.  If you do not have a primary care provider, please call [phone] and they will assist you.      [facility] is an electronic gateway that provides easy, online access to your medical records. With [facility], you can request a clinic appointment, read your test results, renew a prescription or communicate with your care team.     To access your existing account,  please contact your [facility] or call [phone] for assistance.        Care EveryWhere ID     This is your Care EveryWhere ID. This could be used by other organizations to access your [facility] medical records  [id number]        Your Vitals Were     Pulse Temperature Last Period Pulse Oximetry BMI (Body Mass Index)       49 97.7  F (36.5  C) (Temporal) 12/21/2007 97% 29.11 kg/m2        Blood Pressure from Last 3 Encounters:   09/10/18 134/61   06/25/18 150/76   05/21/18 128/60    Weight from Last 3 Encounters:   09/10/18 179 lb (81.2 kg)   06/25/18 169 lb (76.7 kg)   05/21/18 165 lb 14.4 oz (75.3 kg)              We Performed the Following     C FOOT EXAM  NO CHARGE     FLU VACCINE, INCREASED ANTIGEN, PRESV FREE          Today's Medication Changes          These changes are accurate as of 9/10/18 11:59 AM.  If you have any questions, ask your nurse or doctor.               Start taking these medicines.        Dose/Directions    BASAGLAR 100 UNIT/ML injection   Used for:  Type 2 diabetes mellitus with microalbuminuria, without long-term current use of insulin (H)   Started by:  Jennifer Thakur MD PhD        Dose:  46 Units   Inject 46 Units Subcutaneous At Bedtime   Quantity:  45 mL   Refills:  1       repaglinide 1 MG tablet   Commonly known as:  PRANDIN   Used for:  Flu vaccine need   Started by:  Jennifer Thakur MD PhD        Dose:  1 mg   Take 1 tablet (1 mg) by mouth 3 times daily (before meals)   Quantity:  270 tablet   Refills:  0            Where to get your medicines      These medications were sent to [facility] - [place], [place] - [address] [facility] [address]  [address] [facility] [address], [place] [address]     Phone:  [phone]     BASAGLAR 100 UNIT/ML injection    repaglinide 1 MG tablet                Primary Care Provider Office Phone # Fax #    Jennifer Thakur MD PhD [phone]       [address]  [facility] [address]        Equal Access to  Services     [facility]: Hadii aad ku hadhakeempamela Carrington, waaxda luqadaha, qaybta kaalmada delbert, raymundo tsock . So [facility] [phone].    ATENCIÓN: Si habla espdamon, tiene a singh disposición servicios gratuitos de asistencia lingüística. Llame al [phone].    We comply with applicable federal civil rights laws and [place] laws. We do not discriminate on the basis of race, color, national origin, age, disability, sex, sexual orientation, or gender identity.            Thank you!     Thank you for choosing [facility]  for your care. Our goal is always to provide you with excellent care. Hearing back from our patients is one way we can continue to improve our services. Please take a few minutes to complete the written survey that you may receive in the mail after your visit with us. Thank you!             Your Updated Medication List - Protect others around you: Learn how to safely use, store and throw away your medicines at www.disposemymeds.org.          This list is accurate as of 9/10/18 11:59 AM.  Always use your most recent med list.                   Brand Name Dispense Instructions for use Diagnosis    amLODIPine 10 MG tablet    NORVASC    90 tablet    Take 1 tablet (10 mg) by mouth daily    Hypertension goal BP (blood pressure) < 130/80       aspirin 81 MG tablet      1 TABLET DAILY        atenolol 100 MG tablet    TENORMIN    90 tablet    Take 1 tablet (100 mg) by mouth daily    Essential hypertension       atorvastatin 40 MG tablet    LIPITOR    90 tablet    Take 1 tablet (40 mg) by mouth daily    Hyperlipidemia LDL goal <100       BASAGLAR 100 UNIT/ML injection     45 mL    Inject 46 Units Subcutaneous At Bedtime    Type 2 diabetes mellitus with microalbuminuria, without long-term current use of insulin (H)       blood glucose lancets standard    no brand specified    300 each    Use to test blood sugar 3 times daily or as directed.    Type 2 diabetes  mellitus with hyperglycemia, with long-term current use of insulin (H)       blood glucose monitoring lancets     300 each    Use to check blood sugar 3-4 times daily    Type 2 diabetes, HbA1c goal < 7% (H)       * blood glucose monitoring meter device kit     1 kit    Use to test blood sugars 3-4  times daily or as directed.    Hypertension goal BP (blood pressure) < 130/80, Type 2 diabetes, HbA1c goal < 7% (H), Hyperlipidemia LDL goal <100, Need for prophylactic vaccination and inoculation against influenza       * blood glucose monitoring meter device kit    no brand specified    1 kit    Use to test blood sugar 3 times daily or as directed.    Type 2 diabetes mellitus with hyperglycemia, with long-term current use of insulin (H)       * blood glucose monitoring test strip    ACCU-CHEK COMPACT DRUM    400 strip    Use to test blood sugars 3-4 times daily or as directed.    Type 2 diabetes, HbA1c goal < 7% (H)       * blood glucose monitoring test strip    no brand specified    300 strip    Use to test blood sugars 3 times daily or as directed    Type 2 diabetes mellitus with hyperglycemia, with long-term current use of insulin (H)       calcium carbonate 600 mg-vitamin D 400 units 600-400 MG-UNIT per tablet    calcium 600 + D    90 tablet    Take 1 tablet by mouth daily    Type 2 diabetes mellitus without complication, without long-term current use of insulin (H)       canagliflozin 300 MG tablet    INVOKANA    90 tablet    Take 1 tablet (300 mg) by mouth every morning (before breakfast)    Type 2 diabetes mellitus with hyperglycemia, with long-term current use of insulin (H), Type 2 diabetes mellitus with microalbuminuria, without long-term current use of insulin (H)       continuous blood glucose monitoring sensor     3 each    For use with Freestyle Derrick Flash  for continuous monitioring of blood glucose levels. Replace sensor every 10 days.    Diabetes mellitus, type 2 (H)       FREESTYLE DERRICK  READER Elizabeth     1 Device    1 each continuous    Diabetes mellitus, type 2 (H)       glipiZIDE 10 MG tablet    GLUCOTROL    180 tablet    Take 1 tablet (10 mg) by mouth 2 times daily (before meals)    Type 2 diabetes mellitus without complication, without long-term current use of insulin (H)       insulin pen needle 32G X 4 MM    BD BRITTON U/F    100 each    Use 1 daily or as directed.    Type 2 diabetes mellitus with hyperglycemia, with long-term current use of insulin (H)       losartan-hydrochlorothiazide 100-25 MG per tablet    HYZAAR    90 tablet    Take 1 tablet by mouth daily    Benign essential hypertension       MULTIVITAMIN TABS   OR      1 TABLET DAILY        repaglinide 1 MG tablet    PRANDIN    270 tablet    Take 1 tablet (1 mg) by mouth 3 times daily (before meals)    Flu vaccine need       * Notice:  This list has 4 medication(s) that are the same as other medications prescribed for you. Read the directions carefully, and ask your doctor or other care provider to review them with you.

## 2018-09-10 NOTE — PROGRESS NOTES

## 2018-09-10 NOTE — PROGRESS NOTES
Dear Isabella,   Here are your recent results that we reviewed at your recent clinic visit. Please continue care plan during the visit and follow up as planned.     Please call or Mychart if you have further questions.     Jennifer Thakur MD-PhD

## 2018-09-10 NOTE — TELEPHONE ENCOUNTER
Per Dr. Thakur, cancel this order for Basaglar medication  to [facility].    Called [facility], spoke to Lucia.      Accepted the cancellation.    Inés Tim RN, [facility]

## 2018-09-10 NOTE — PROGRESS NOTES
SUBJECTIVE:   Isabella Mcginnis is a 68 year old female who presents to clinic today for the following health issues:      Diabetes Follow-up      Patient is checking blood sugars: downloaded    Diabetic concerns: None     Symptoms of hypoglycemia (low blood sugar): shaky, weak, Sweaty     Paresthesias (numbness or burning in feet) or sores: No     Date of last diabetic eye exam: UTD    BP Readings from Last 2 Encounters:   09/10/18 134/61   06/25/18 150/76     Hemoglobin A1C (%)   Date Value   09/10/2018 10.2 (H)   05/18/2018 13.0 (H)     LDL Cholesterol Calculated (mg/dL)   Date Value   05/18/2018 55   05/20/2013 101     LDL Cholesterol Direct (mg/dL)   Date Value   06/15/2015 85   05/20/2014 80       Diabetes Management Resources    Amount of exercise or physical activity: 4-5 days/week for an average of 15-30 minutes    Problems taking medications regularly: No    Medication side effects: none    Diet: regular (no restrictions)    Patient reports glucose readings are reasonable in the mornings.  Were not able to download her glucometer.  In looking through the numbers on the glucometer itself, the last 8 readings range from .  Majority of the numbers are in the 90s-120s.  She had one evening number of 339.  Currently she is on Basaglar 46 units daily.  She did have one episode of hypoglycemia of 74, they wake her up at night.  She does not want to increase on Basaglar.    She takes Invokana 300 mg daily, glipizide 10 mg twice a day.  Previously he has severe reaction to Actos and Victoza.  Diarrhea side effects with metformin.  She informs me that she will stop taking Invokana after this last month.  Because the price will go up to $1200 per month and this is not affordable.    Hypertension: This has been well controlled.  Her pulse has been trending on the lower side.  She is on atenolol 100 mg daily.    ROS:  Constitutional, HEENT, cardiovascular, pulmonary, gi and gu systems are negative, except as  otherwise noted.         Current Outpatient Prescriptions on File Prior to Visit:  amLODIPine (NORVASC) 10 MG tablet Take 1 tablet (10 mg) by mouth daily   ASPIRIN 81 MG OR TABS 1 TABLET DAILY   atenolol (TENORMIN) 100 MG tablet Take 1 tablet (100 mg) by mouth daily   atorvastatin (LIPITOR) 40 MG tablet Take 1 tablet (40 mg) by mouth daily   calcium-vitamin D (CALCIUM 600 + D) 600-400 MG-UNIT per tablet Take 1 tablet by mouth daily   canagliflozin (INVOKANA) 300 MG tablet Take 1 tablet (300 mg) by mouth every morning (before breakfast)   glipiZIDE (GLUCOTROL) 10 MG tablet Take 1 tablet (10 mg) by mouth 2 times daily (before meals)   losartan-hydrochlorothiazide (HYZAAR) 100-25 MG per tablet Take 1 tablet by mouth daily   blood glucose (NO BRAND SPECIFIED) lancets standard Use to test blood sugar 3 times daily or as directed.   blood glucose monitoring (NO BRAND SPECIFIED) meter device kit Use to test blood sugar 3 times daily or as directed.   blood glucose monitoring (NO BRAND SPECIFIED) test strip Use to test blood sugars 3 times daily or as directed   Blood Glucose Monitoring Suppl (ACCU-CHEK COMPACT CARE KIT) KIT Use to test blood sugars 3-4  times daily or as directed. (Patient not taking: Reported on 5/21/2018)   Continuous Blood Gluc  (FREESTYLE ALFREDITO READER) MARLEN 1 each continuous   continuous blood glucose monitoring (FREESTYLE ALFREDITO) sensor For use with Freestyle Alfredito Flash  for continuous monitioring of blood glucose levels. Replace sensor every 10 days.   glucose blood VI test strips (ACCU-CHEK COMPACT DRUM TEST STRIPS) strip Use to test blood sugars 3-4 times daily or as directed. (Patient not taking: Reported on 5/21/2018)   insulin pen needle (BD BRITTON U/F) 32G X 4 MM Use 1 daily or as directed.   MULTIVITAMIN TABS   OR 1 TABLET DAILY   SOFTCLIX LANCETS MISC Use to check blood sugar 3-4 times daily (Patient not taking: Reported on 5/21/2018)   [DISCONTINUED] BASAGLAR 100 UNIT/ML  injection Inject 46 Units Subcutaneous At Bedtime   [DISCONTINUED] insulin glargine (LANTUS SOLOSTAR) 100 UNIT/ML pen Inject 10 Units Subcutaneous At Bedtime (Patient not taking: Reported on 6/25/2018)     No current facility-administered medications on file prior to visit.        Patient Active Problem List   Diagnosis     Hypertension goal BP (blood pressure) < 130/80     Rheumatic fever     Obesity     TYPE 2 DIABETES, HBA1C GOAL < 7%     HYPERLIPIDEMIA LDL GOAL <100     White coat syndrome with hypertension     Past Surgical History:   Procedure Laterality Date     NO HISTORY OF SURGERY         Social History   Substance Use Topics     Smoking status: Never Smoker     Smokeless tobacco: Never Used     Alcohol use No     Family History   Problem Relation Age of Onset     Diabetes Mother      Hypertension Mother      Cerebrovascular Disease Mother      C.A.D. Father      Asthma No family hx of      Breast Cancer No family hx of      Cancer - colorectal No family hx of      Prostate Cancer No family hx of              Problem list, Medication list, Allergies, and Medical/Social/Surgical histories reviewed in [facility] and updated as appropriate.    OBJECTIVE:                                                    /61  Pulse (!) 49  Temp 97.7  F (36.5  C) (Temporal)  Wt 179 lb (81.2 kg)  LMP 12/21/2007  SpO2 97%  BMI 29.11 kg/m2    GENERAL: healthy, alert and no distress  HEENT: unremarkable  Neck: no adenopathy/mass/stiffness. Thyroid normal.  Lung: clear, no wheezing/rhonchi/crackles  Heart: RRR, normal S1/2, no murmur/gallop/rup  Abd: soft, normal BS, non tender, no organomegaly   Ext: no cyanosis/clubbing/edema  Foot exam: normal sensation, DP 2+, filament test normal.        Diagnostic test results:  Results for orders placed or performed in visit on 09/10/18   **A1C FUTURE anytime   Result Value Ref Range    Hemoglobin A1C 10.2 (H) 0 - 5.6 %         ASSESSMENT/PLAN:                                                       68 year old female with the following diagnoses and treatment plan:      ICD-10-CM    1. Type 2 diabetes mellitus with microalbuminuria, without long-term current use of insulin (H) E11.29 BASAGLAR 100 UNIT/ML injection    R80.9 **A1C FUTURE anytime     C FOOT EXAM  NO CHARGE     DISCONTINUED: BASAGLAR 100 UNIT/ML injection   2. Flu vaccine need Z23 FLU VACCINE, INCREASED ANTIGEN, PRESV FREE     repaglinide (PRANDIN) 1 MG tablet     ADMIN 1st VACCINE   3. Hypertension goal BP (blood pressure) < 130/80 I10    4. Bradycardia R00.1        --For diabetes: We added Prandin before mealtime this will likely not be sufficient in controlling her diabetes especially if she goes off Invokana.  We may need to do mealtime insulin.  Patient feels his insulin caused her to gain weight.  We will hold off on that for the time being.  --Hypertension: Blood pressures well controlled.  She has bradycardia, but asymptomatic.  She is planning to meet with our MTM pharmacist.  We may consider changing atenolol to metoprolol to see if we can control her blood pressure with less beta-blocking effect.  -- Patient is planning to move to [place]. Return for follow-up in November before her permanent move .     Will call or return to clinic if worsening or symptoms not improving as discussed.  See Patient Instructions.      Jennifer Thakur MD-PhD  [facility]    (Note: Chart documentation was done in part with Dragon Voice Recognition software. Although reviewed after completion, some word and grammatical errors may remain.)

## 2018-09-10 NOTE — MR AVS SNAPSHOT
After Visit Summary   9/10/2018    Isabella Mcginnis    MRN: [id number]           Patient Information     Date Of Birth          1950        Visit Information        Provider Department      9/10/2018 1:00 PM Inés Flores, [facility] MTM        Care Instructions    Recommendations from today's MTM visit:                                                    MTM (medication therapy management) is a service provided by a clinical pharmacist designed to help you get the most of out of your medicines.   Today we reviewed what your medicines are for, how to know if they are working, that your medicines are safe and how to make your medicine regimen as easy as possible.     1. Split atenolol dose and take 50mg in the morning and 50mg in the evening.     2. Check blood pressure daily and send readings in 2 weeks via Makstr    3. Check blood sugars 2-3 times a week 2 hours after breakfast and send readings in 2 weeks    4. Aim for 1200mg of calcium a day.    5. NPH and R insulin (Relion) through [employer] or through [place] pharmacy. Also 70/30 insulin would be available at $25 dollar    Next MTM visit: 2 weeks via Makstr    To schedule another MTM appointment, please call the clinic directly or you may call the MTM scheduling line at [phone] or toll-free at [phone].     My Clinical Pharmacist's contact information:                                                      It was a pleasure seeing you today!  Please feel free to contact me with any questions or concerns you have.      Inés Flores, Pharm.D, [facility]  Medication Therapy Management Pharmacist  [phone]    You may receive a survey about the MTM services you received.  I would appreciate your feedback to help me serve you better in the future. Please fill it out and return it when you can. Your comments will be anonymous.                  Follow-ups after your visit        Your next 10 appointments  already scheduled     Nov 20, 2018 11:30 AM CST   LAB with LAB FIRST FLOOR [facility]    [address] [address] [place] [id number] [phone]           Please do not eat 10-12 hours before your appointment if you are coming in fasting for labs on lipids, cholesterol, or glucose (sugar). This does not apply to pregnant women. Water, hot tea and black coffee (with nothing added) are okay. Do not drink other fluids, diet soda or chew gum.            Nov 20, 2018 11:50 AM CST   [facility] with Jennifer Thakur MD PhD   [facility]    [address] [facility] [address] [phone]              Future tests that were ordered for you today     Open Future Orders        Priority Expected Expires Ordered    **A1C FUTURE anytime Routine 9/10/2018 9/10/2019 9/10/2018            Who to contact     If you have questions or need follow up information about today's clinic visit or your schedule please contact [facility] directly at [phone].  Normal or non-critical lab and imaging results will be communicated to you by MyChart, letter or phone within 4 business days after the clinic has received the results. If you do not hear from us within 7 days, please contact the clinic through Popdust[employer] or phone. If you have a critical or abnormal lab result, we will notify you by phone as soon as possible.  Submit refill requests through [employer] or call your pharmacy and they will forward the refill request to us. Please allow 3 business days for your refill to be completed.          Additional Information About Your Visit        Pop[employer]hart Information     [employer] gives you secure access to your electronic health record. If you see a primary care provider, you can also send messages to your care team and make appointments. If you have questions, please call your primary care clinic.  If you do not  have a primary care provider, please call [phone] and they will assist you.        Care EveryWhere ID     This is your Care EveryWhere ID. This could be used by other organizations to access your [place] medical records  [id number]        Your Vitals Were     Last Period                   12/21/2007            Blood Pressure from Last 3 Encounters:   09/10/18 134/61   06/25/18 150/76   05/21/18 128/60    Weight from Last 3 Encounters:   09/10/18 179 lb (81.2 kg)   06/25/18 169 lb (76.7 kg)   05/21/18 165 lb 14.4 oz (75.3 kg)              Today, you had the following     No orders found for display         Today's Medication Changes          These changes are accurate as of 9/10/18  1:48 PM.  If you have any questions, ask your nurse or doctor.               Start taking these medicines.        Dose/Directions    BASAGLAR 100 UNIT/ML injection   Used for:  Type 2 diabetes mellitus with microalbuminuria, without long-term current use of insulin (H)   Started by:  Jennifer Thakur MD PhD        Dose:  46 Units   Inject 46 Units Subcutaneous At Bedtime   Quantity:  45 mL   Refills:  1       repaglinide 1 MG tablet   Commonly known as:  PRANDIN   Used for:  Flu vaccine need   Started by:  Jennifer Thakur MD PhD        Dose:  1 mg   Take 1 tablet (1 mg) by mouth 3 times daily (before meals)   Quantity:  270 tablet   Refills:  0         Stop taking these medicines if you haven't already. Please contact your care team if you have questions.     continuous blood glucose monitoring sensor   Stopped by:  Inés Flores RPH           FREESTYLE ALFREDITO READER Elizabeth   Stopped by:  Inés Flores RPH                Where to get your medicines      These medications were sent to [facility] - [place], [place] - [address] [facility] [address]  [address] [facility] [address], [place] [address]     Phone:  [phone]     BASAGLAR 100 UNIT/ML injection    repaglinide 1 MG tablet                Primary Care  Provider Office Phone # Fax #    Jennifer Thakur MD PhD [phone] [address]  [facility] [address]        Equal Access to Services     JAMAICA CASTILLO : Hadii aad ku hadhakeempamela Vilmariaz, waalda sonasnehal, qamarielenata karanjanada delbert, raymundo ambriz guzmanwang mora montrell almonte. So [facility] [phone].    ATENCIÓN: Si habla español, tiene a singh disposición servicios gratuitos de asistencia lingüística. Llame al [phone].    We comply with applicable federal civil rights laws and [place] laws. We do not discriminate on the basis of race, color, national origin, age, disability, sex, sexual orientation, or gender identity.            Thank you!     Thank you for choosing [facility]  for your care. Our goal is always to provide you with excellent care. Hearing back from our patients is one way we can continue to improve our services. Please take a few minutes to complete the written survey that you may receive in the mail after your visit with us. Thank you!             Your Updated Medication List - Protect others around you: Learn how to safely use, store and throw away your medicines at www.disposemymeds.org.          This list is accurate as of 9/10/18  1:48 PM.  Always use your most recent med list.                   Brand Name Dispense Instructions for use Diagnosis    amLODIPine 10 MG tablet    NORVASC    90 tablet    Take 1 tablet (10 mg) by mouth daily    Hypertension goal BP (blood pressure) < 130/80       aspirin 81 MG tablet      1 TABLET DAILY        atenolol 100 MG tablet    TENORMIN    90 tablet    Take 1 tablet (100 mg) by mouth daily    Essential hypertension       atorvastatin 40 MG tablet    LIPITOR    90 tablet    Take 1 tablet (40 mg) by mouth daily    Hyperlipidemia LDL goal <100       BASAGLAR 100 UNIT/ML injection     45 mL    Inject 46 Units Subcutaneous At Bedtime    Type 2 diabetes mellitus with microalbuminuria, without long-term current use of insulin (H)        blood glucose lancets standard    no brand specified    300 each    Use to test blood sugar 3 times daily or as directed.    Type 2 diabetes mellitus with hyperglycemia, with long-term current use of insulin (H)       blood glucose monitoring meter device kit     1 kit    Use to test blood sugars 3-4  times daily or as directed.    Hypertension goal BP (blood pressure) < 130/80, Type 2 diabetes, HbA1c goal < 7% (H), Hyperlipidemia LDL goal <100, Need for prophylactic vaccination and inoculation against influenza       calcium carbonate 600 mg-vitamin D 400 units 600-400 MG-UNIT per tablet    calcium 600 + D    90 tablet    Take 1 tablet by mouth daily    Type 2 diabetes mellitus without complication, without long-term current use of insulin (H)       canagliflozin 300 MG tablet    INVOKANA    90 tablet    Take 1 tablet (300 mg) by mouth every morning (before breakfast)    Type 2 diabetes mellitus with hyperglycemia, with long-term current use of insulin (H), Type 2 diabetes mellitus with microalbuminuria, without long-term current use of insulin (H)       glipiZIDE 10 MG tablet    GLUCOTROL    180 tablet    Take 1 tablet (10 mg) by mouth 2 times daily (before meals)    Type 2 diabetes mellitus without complication, without long-term current use of insulin (H)       insulin pen needle 32G X 4 MM    BD BRITTON U/F    100 each    Use 1 daily or as directed.    Type 2 diabetes mellitus with hyperglycemia, with long-term current use of insulin (H)       losartan-hydrochlorothiazide 100-25 MG per tablet    HYZAAR    90 tablet    Take 1 tablet by mouth daily    Benign essential hypertension       MULTIVITAMIN TABS   OR      1 TABLET DAILY        repaglinide 1 MG tablet    PRANDIN    270 tablet    Take 1 tablet (1 mg) by mouth 3 times daily (before meals)    Flu vaccine need

## 2018-09-10 NOTE — PROGRESS NOTES
SUBJECTIVE/OBJECTIVE:                           Isabella Mcginnis is a 68 year old female coming in for an initial visit for Medication Therapy Management.  She was referred to me from [facility].    Chief Complaint: Medication Review.  Personal Healthcare Goals: Lose weight, once she started insulin she was hungry all of the time; patient moving to [place] November 1st.     Allergies/ADRs: Reviewed in Epic  Tobacco: No tobacco use  Alcohol: not currently using  Caffeine: 3 cans diet coke sodas/day  Activity: walk 3-5 days a week for 30 minutes  PMH: Reviewed in Epic    Medication Adherence/Access:  Patient uses pill box(es).  Medication barriers: fears/concerns about none and affording medications.   The patient fills medications at [place]: NO, fills medications at [facility] mail order.    Hypertension: Current medications include amlodopine 10mg at bedtime, atenolol 100mg qam, losartan/HCTZ 100-25mg qam.  Patient does not self-monitor BP.  Patient reports no current medication side effects. When there was an atenolol shortage patient was on metoprolol (pt unsure if on ER ) but it was more expensive.     Hyperlipidemia: Current therapy includes atorvastatin 40mg once daily.  Pt reports no significant myalgias or other side effects.  The 10-year ASCVD risk score (Lawrence DC Jr, et al., 2013) is: 16.5%    Values used to calculate the score:      Age: 68 years      Sex: Female      Is Non- : No      Diabetic: Yes      Tobacco smoker: No      Systolic Blood Pressure: 134 mmHg      Is BP treated: Yes      HDL Cholesterol: 47 mg/dL      Total Cholesterol: 133 mg/dL     Diabetes:  Pt currently taking glipizide 10mg bid, Basaglar 46 units daily in the am (pt lowers her dose if she is going going to be active cut grass) she will decrease by about 10 units, invokana 300mg daily (pt has 30 days left but then won't renew because of the cost and it contributing to the donut hole), patient will  start prandin after she runs out of invokana   Patient had one 74mg/dL in the middle of the night and felt like she needed to eat. This happened another time but can't remember her reading. Pt is not experiencing side effects.  SMBG: 3-4 times a week in the morning.   Ranges (patient reported): 121, 108, 169,74,143,94,99mg/dL  Post prandial 339mg/dL  Patient is experiencing hypoglycemia. Frequency of hypoglycemia? A couple of times. Symptoms of low blood sugar? sweaty, hungry. Patient feels low around 70mg/dL.   Recent symptoms of high blood sugar? none  Eye exam: up to date  Foot exam: up to date  ACEi/ARB: Yes: losartan.   Urine Albumin:   Lab Results   Component Value Date    UMALCR 15.10 05/18/2018      Aspirin: Taking 81mg daily and denies side effects  Diet/Exercise: Doesn't eat meals but snacks more often.   Breakfast: fruit, yogurt, frozen waffle with SF syrup, toast  Snack 11am-popcorn or apple  Late afternoon-sandwich or soup (some kind of protein)  Snack-raw vegetables  Will go out eat about 3 times a week  Not a big meat eater, no mashed potatoes, does not eat much pasta or rice, no juice or regular soda  Patient said the [employer] system is about $200/month  Follows weight watchers    Sleep: doesn't sleep very well, either can't fall asleep or wakes up in the middle of the night. Unable to discuss further today.     Supplements: current therapy includes MVI.    Bone Health: current therapy includes Calcium + Vitamin D 600mg/400u once daily in the am. Patient does not drink dairy, a few salads a couple of times a week.    Today's Vitals: LMP 12/21/2007 See office visit from today.   BP Readings from Last 3 Encounters:   09/10/18 134/61   06/25/18 150/76   05/21/18 128/60       ASSESSMENT:                             Current medications were reviewed today. Medicare Part D topics discussed:Self-monitoring and Timing of medication    Medication Adherence: fair, no issues identified    Hypertension: Needs  improvement. Patient's blood pressure is at goal of <140/90 mmHg. For continued blood pressure control, patient may benefit from splitting her atenolol dose. Patient would benefit from self monitoring blood pressure at home.     Hyperlipidemia: Stable.     Diabetes: Needs improvement. Patient's A1c is not at goal of <7%, but has improved since last visit.   Patient's SMBG is not within goal of  mg/dL. She will be switching to Prandin in approximately one month after she discontinues Invokana, thus no additional medication changes will be made at this time. Patient would benefit from more consistent self-monitoring of her blood glucose fasting and 2-hours post-prandial. Discussed cheaper insulin alternatives for the future. Provided information for prescription assistance for invokana.    Sleep: Needs improvement. Further assessment required on follow-up.     Supplements: Stable.     Bone Health: Needs improvement. Patient may benefit from increasing her calcium intake from her diet to obtain a total of 1200 mg per day.     PLAN:                            1. Split atenolol dose and take 50mg in the morning and 50mg in the evening.     2. Check blood pressure daily and send readings in 2 weeks via [employer]    3. Check blood sugars 2-3 times a week 2 hours after breakfast and send readings in 2 weeks    4. Aim for 1200mg of calcium a day.    5. NPH and R insulin (Relion) through [facility] or through [place] pharmacy, also 70/30 insulin would be available at $25.    I spent 60 minutes with this patient today. An additional 15 minutes were spent creating Medication Action Plan. All changes were made via collaborative practice agreement with Jennifer Downs. A copy of the visit note was provided to the patient's primary care provider.    Will follow up in 2 weeks via [employer]ility.    The patient was given a summary of these recommendations as an after visit summary.     Dee Phipps, PharmD IV Student  Inés Flores,  Pharm.D, [facility]  Medication Therapy Management Pharmacist

## 2019-01-09 ENCOUNTER — TELEPHONE (OUTPATIENT)
Dept: PHARMACY | Facility: CLINIC | Age: 69
End: 2019-01-09

## 2019-09-28 ENCOUNTER — HEALTH MAINTENANCE LETTER (OUTPATIENT)
Age: 69
End: 2019-09-28

## 2020-03-15 ENCOUNTER — HEALTH MAINTENANCE LETTER (OUTPATIENT)
Age: 70
End: 2020-03-15

## 2021-01-10 ENCOUNTER — HEALTH MAINTENANCE LETTER (OUTPATIENT)
Age: 71
End: 2021-01-10

## 2021-05-08 ENCOUNTER — HEALTH MAINTENANCE LETTER (OUTPATIENT)
Age: 71
End: 2021-05-08

## 2021-08-28 ENCOUNTER — HEALTH MAINTENANCE LETTER (OUTPATIENT)
Age: 71
End: 2021-08-28

## 2021-10-23 ENCOUNTER — HEALTH MAINTENANCE LETTER (OUTPATIENT)
Age: 71
End: 2021-10-23

## 2022-04-09 ENCOUNTER — HEALTH MAINTENANCE LETTER (OUTPATIENT)
Age: 72
End: 2022-04-09

## 2022-06-04 ENCOUNTER — HEALTH MAINTENANCE LETTER (OUTPATIENT)
Age: 72
End: 2022-06-04

## 2022-10-09 ENCOUNTER — HEALTH MAINTENANCE LETTER (OUTPATIENT)
Age: 72
End: 2022-10-09

## 2022-11-26 ENCOUNTER — HEALTH MAINTENANCE LETTER (OUTPATIENT)
Age: 72
End: 2022-11-26

## 2023-02-18 ENCOUNTER — HEALTH MAINTENANCE LETTER (OUTPATIENT)
Age: 73
End: 2023-02-18

## 2023-06-10 ENCOUNTER — HEALTH MAINTENANCE LETTER (OUTPATIENT)
Age: 73
End: 2023-06-10

## 2024-01-06 ENCOUNTER — HEALTH MAINTENANCE LETTER (OUTPATIENT)
Age: 74
End: 2024-01-06